# Patient Record
Sex: MALE | Race: WHITE | NOT HISPANIC OR LATINO
[De-identification: names, ages, dates, MRNs, and addresses within clinical notes are randomized per-mention and may not be internally consistent; named-entity substitution may affect disease eponyms.]

---

## 2017-12-01 PROBLEM — Z00.00 ENCOUNTER FOR PREVENTIVE HEALTH EXAMINATION: Status: ACTIVE | Noted: 2017-12-01

## 2017-12-07 ENCOUNTER — APPOINTMENT (OUTPATIENT)
Dept: VASCULAR SURGERY | Facility: CLINIC | Age: 57
End: 2017-12-07
Payer: MEDICARE

## 2017-12-07 VITALS — BODY MASS INDEX: 33.88 KG/M2 | WEIGHT: 242 LBS | HEIGHT: 71 IN

## 2017-12-07 DIAGNOSIS — Z89.512 ACQUIRED ABSENCE OF LEFT LEG BELOW KNEE: ICD-10-CM

## 2017-12-07 PROCEDURE — 99024 POSTOP FOLLOW-UP VISIT: CPT

## 2017-12-08 PROBLEM — Z89.512 S/P BKA (BELOW KNEE AMPUTATION), LEFT: Status: ACTIVE | Noted: 2017-12-08

## 2017-12-21 ENCOUNTER — APPOINTMENT (OUTPATIENT)
Dept: VASCULAR SURGERY | Facility: CLINIC | Age: 57
End: 2017-12-21
Payer: MEDICARE

## 2017-12-21 PROCEDURE — 99024 POSTOP FOLLOW-UP VISIT: CPT

## 2021-03-22 ENCOUNTER — OUTSIDE FACILITY SERVICE (OUTPATIENT)
Dept: CARDIOLOGY | Facility: CLINIC | Age: 61
End: 2021-03-22

## 2021-03-22 ENCOUNTER — HOSPITAL ENCOUNTER (OUTPATIENT)
Facility: HOSPITAL | Age: 61
Setting detail: SURGERY ADMIT
End: 2021-03-22
Attending: THORACIC SURGERY (CARDIOTHORACIC VASCULAR SURGERY) | Admitting: THORACIC SURGERY (CARDIOTHORACIC VASCULAR SURGERY)

## 2021-03-22 ENCOUNTER — APPOINTMENT (OUTPATIENT)
Dept: GENERAL RADIOLOGY | Facility: HOSPITAL | Age: 61
End: 2021-03-22

## 2021-03-22 ENCOUNTER — PREP FOR SURGERY (OUTPATIENT)
Dept: OTHER | Facility: HOSPITAL | Age: 61
End: 2021-03-22

## 2021-03-22 ENCOUNTER — HOSPITAL ENCOUNTER (INPATIENT)
Facility: HOSPITAL | Age: 61
LOS: 10 days | Discharge: HOME | End: 2021-04-01
Attending: THORACIC SURGERY (CARDIOTHORACIC VASCULAR SURGERY) | Admitting: INTERNAL MEDICINE

## 2021-03-22 DIAGNOSIS — I50.9 CONGESTIVE HEART FAILURE, UNSPECIFIED HF CHRONICITY, UNSPECIFIED HEART FAILURE TYPE (HCC): ICD-10-CM

## 2021-03-22 DIAGNOSIS — Z74.09 IMPAIRED MOBILITY AND ADLS: ICD-10-CM

## 2021-03-22 DIAGNOSIS — Z78.9 IMPAIRED MOBILITY AND ADLS: ICD-10-CM

## 2021-03-22 DIAGNOSIS — G47.00 INSOMNIA, UNSPECIFIED TYPE: ICD-10-CM

## 2021-03-22 DIAGNOSIS — Z74.09 IMPAIRED FUNCTIONAL MOBILITY, BALANCE, GAIT, AND ENDURANCE: ICD-10-CM

## 2021-03-22 DIAGNOSIS — Z74.09 IMPAIRED MOBILITY: Primary | ICD-10-CM

## 2021-03-22 DIAGNOSIS — I25.110 CORONARY ARTERY DISEASE INVOLVING NATIVE CORONARY ARTERY OF NATIVE HEART WITH UNSTABLE ANGINA PECTORIS (HCC): ICD-10-CM

## 2021-03-22 DIAGNOSIS — I25.10 MULTIPLE VESSEL CORONARY ARTERY DISEASE: Primary | ICD-10-CM

## 2021-03-22 LAB
ABO GROUP BLD: NORMAL
ALBUMIN SERPL-MCNC: 2.5 G/DL (ref 3.5–5.2)
ALBUMIN/GLOB SERPL: 0.7 G/DL
ALP SERPL-CCNC: 97 U/L (ref 39–117)
ALT SERPL W P-5'-P-CCNC: 5 U/L (ref 1–41)
ANION GAP SERPL CALCULATED.3IONS-SCNC: 11 MMOL/L (ref 5–15)
AST SERPL-CCNC: 12 U/L (ref 1–40)
BASOPHILS # BLD AUTO: 0.06 10*3/MM3 (ref 0–0.2)
BASOPHILS NFR BLD AUTO: 0.5 % (ref 0–1.5)
BILIRUB SERPL-MCNC: 1 MG/DL (ref 0–1.2)
BLD GP AB SCN SERPL QL: NEGATIVE
BUN SERPL-MCNC: 33 MG/DL (ref 8–23)
BUN/CREAT SERPL: 24.4 (ref 7–25)
CA-I SERPL ISE-MCNC: 1.28 MMOL/L (ref 1.12–1.32)
CALCIUM SPEC-SCNC: 8.1 MG/DL (ref 8.6–10.5)
CHLORIDE SERPL-SCNC: 102 MMOL/L (ref 98–107)
CK SERPL-CCNC: 137 U/L (ref 20–200)
CO2 SERPL-SCNC: 25 MMOL/L (ref 22–29)
CREAT SERPL-MCNC: 1.35 MG/DL (ref 0.76–1.27)
D-LACTATE SERPL-SCNC: 0.9 MMOL/L (ref 0.5–2)
DEPRECATED RDW RBC AUTO: 46 FL (ref 37–54)
EOSINOPHIL # BLD AUTO: 0.33 10*3/MM3 (ref 0–0.4)
EOSINOPHIL NFR BLD AUTO: 2.7 % (ref 0.3–6.2)
ERYTHROCYTE [DISTWIDTH] IN BLOOD BY AUTOMATED COUNT: 14 % (ref 12.3–15.4)
GFR SERPL CREATININE-BSD FRML MDRD: 53 ML/MIN/1.73
GLOBULIN UR ELPH-MCNC: 3.7 GM/DL
GLUCOSE BLDC GLUCOMTR-MCNC: 117 MG/DL (ref 70–130)
GLUCOSE SERPL-MCNC: 99 MG/DL (ref 65–99)
HBA1C MFR BLD: 11 % (ref 4.8–5.6)
HCT VFR BLD AUTO: 34.1 % (ref 37.5–51)
HGB BLD-MCNC: 11.1 G/DL (ref 13–17.7)
IMM GRANULOCYTES # BLD AUTO: 0.05 10*3/MM3 (ref 0–0.05)
IMM GRANULOCYTES NFR BLD AUTO: 0.4 % (ref 0–0.5)
LYMPHOCYTES # BLD AUTO: 1.75 10*3/MM3 (ref 0.7–3.1)
LYMPHOCYTES NFR BLD AUTO: 14.1 % (ref 19.6–45.3)
MAGNESIUM SERPL-MCNC: 1.6 MG/DL (ref 1.6–2.4)
MCH RBC QN AUTO: 29.7 PG (ref 26.6–33)
MCHC RBC AUTO-ENTMCNC: 32.6 G/DL (ref 31.5–35.7)
MCV RBC AUTO: 91.2 FL (ref 79–97)
MONOCYTES # BLD AUTO: 1.09 10*3/MM3 (ref 0.1–0.9)
MONOCYTES NFR BLD AUTO: 8.8 % (ref 5–12)
NEUTROPHILS NFR BLD AUTO: 73.5 % (ref 42.7–76)
NEUTROPHILS NFR BLD AUTO: 9.11 10*3/MM3 (ref 1.7–7)
NRBC BLD AUTO-RTO: 0 /100 WBC (ref 0–0.2)
NT-PROBNP SERPL-MCNC: ABNORMAL PG/ML (ref 0–900)
PHOSPHATE SERPL-MCNC: 3.8 MG/DL (ref 2.5–4.5)
PLATELET # BLD AUTO: 222 10*3/MM3 (ref 140–450)
PMV BLD AUTO: 11.8 FL (ref 6–12)
POTASSIUM SERPL-SCNC: 3.9 MMOL/L (ref 3.5–5.2)
PROCALCITONIN SERPL-MCNC: 0.3 NG/ML (ref 0–0.25)
PROT SERPL-MCNC: 6.2 G/DL (ref 6–8.5)
RBC # BLD AUTO: 3.74 10*6/MM3 (ref 4.14–5.8)
RH BLD: POSITIVE
SODIUM SERPL-SCNC: 138 MMOL/L (ref 136–145)
T&S EXPIRATION DATE: NORMAL
T4 FREE SERPL-MCNC: 1.51 NG/DL (ref 0.93–1.7)
TSH SERPL DL<=0.05 MIU/L-ACNC: 1.35 UIU/ML (ref 0.27–4.2)
UFH PPP CHRO-ACNC: 0.1 IU/ML (ref 0.3–0.7)
WBC # BLD AUTO: 12.39 10*3/MM3 (ref 3.4–10.8)

## 2021-03-22 PROCEDURE — 93325 DOPPLER ECHO COLOR FLOW MAPG: CPT | Performed by: INTERNAL MEDICINE

## 2021-03-22 PROCEDURE — 83735 ASSAY OF MAGNESIUM: CPT | Performed by: NURSE PRACTITIONER

## 2021-03-22 PROCEDURE — 83880 ASSAY OF NATRIURETIC PEPTIDE: CPT | Performed by: NURSE PRACTITIONER

## 2021-03-22 PROCEDURE — 83036 HEMOGLOBIN GLYCOSYLATED A1C: CPT | Performed by: NURSE PRACTITIONER

## 2021-03-22 PROCEDURE — 82550 ASSAY OF CK (CPK): CPT | Performed by: NURSE PRACTITIONER

## 2021-03-22 PROCEDURE — 85576 BLOOD PLATELET AGGREGATION: CPT | Performed by: THORACIC SURGERY (CARDIOTHORACIC VASCULAR SURGERY)

## 2021-03-22 PROCEDURE — 86900 BLOOD TYPING SEROLOGIC ABO: CPT | Performed by: THORACIC SURGERY (CARDIOTHORACIC VASCULAR SURGERY)

## 2021-03-22 PROCEDURE — 86923 COMPATIBILITY TEST ELECTRIC: CPT

## 2021-03-22 PROCEDURE — 85610 PROTHROMBIN TIME: CPT | Performed by: NURSE PRACTITIONER

## 2021-03-22 PROCEDURE — 84439 ASSAY OF FREE THYROXINE: CPT | Performed by: NURSE PRACTITIONER

## 2021-03-22 PROCEDURE — 93005 ELECTROCARDIOGRAM TRACING: CPT | Performed by: NURSE PRACTITIONER

## 2021-03-22 PROCEDURE — 82962 GLUCOSE BLOOD TEST: CPT

## 2021-03-22 PROCEDURE — 83605 ASSAY OF LACTIC ACID: CPT | Performed by: NURSE PRACTITIONER

## 2021-03-22 PROCEDURE — 84145 PROCALCITONIN (PCT): CPT | Performed by: NURSE PRACTITIONER

## 2021-03-22 PROCEDURE — 80053 COMPREHEN METABOLIC PANEL: CPT | Performed by: NURSE PRACTITIONER

## 2021-03-22 PROCEDURE — 86850 RBC ANTIBODY SCREEN: CPT | Performed by: THORACIC SURGERY (CARDIOTHORACIC VASCULAR SURGERY)

## 2021-03-22 PROCEDURE — 84443 ASSAY THYROID STIM HORMONE: CPT | Performed by: NURSE PRACTITIONER

## 2021-03-22 PROCEDURE — 85730 THROMBOPLASTIN TIME PARTIAL: CPT | Performed by: THORACIC SURGERY (CARDIOTHORACIC VASCULAR SURGERY)

## 2021-03-22 PROCEDURE — 93308 TTE F-UP OR LMTD: CPT | Performed by: INTERNAL MEDICINE

## 2021-03-22 PROCEDURE — 93458 L HRT ARTERY/VENTRICLE ANGIO: CPT | Performed by: INTERNAL MEDICINE

## 2021-03-22 PROCEDURE — 71045 X-RAY EXAM CHEST 1 VIEW: CPT

## 2021-03-22 PROCEDURE — 84484 ASSAY OF TROPONIN QUANT: CPT | Performed by: NURSE PRACTITIONER

## 2021-03-22 PROCEDURE — 99223 1ST HOSP IP/OBS HIGH 75: CPT | Performed by: INTERNAL MEDICINE

## 2021-03-22 PROCEDURE — 84100 ASSAY OF PHOSPHORUS: CPT | Performed by: NURSE PRACTITIONER

## 2021-03-22 PROCEDURE — 85025 COMPLETE CBC W/AUTO DIFF WBC: CPT | Performed by: NURSE PRACTITIONER

## 2021-03-22 PROCEDURE — 93321 DOPPLER ECHO F-UP/LMTD STD: CPT | Performed by: INTERNAL MEDICINE

## 2021-03-22 PROCEDURE — 82330 ASSAY OF CALCIUM: CPT | Performed by: NURSE PRACTITIONER

## 2021-03-22 PROCEDURE — 85520 HEPARIN ASSAY: CPT | Performed by: THORACIC SURGERY (CARDIOTHORACIC VASCULAR SURGERY)

## 2021-03-22 PROCEDURE — 86901 BLOOD TYPING SEROLOGIC RH(D): CPT | Performed by: THORACIC SURGERY (CARDIOTHORACIC VASCULAR SURGERY)

## 2021-03-22 RX ORDER — HEPARIN SODIUM 10000 [USP'U]/100ML
17 INJECTION, SOLUTION INTRAVENOUS
Status: DISCONTINUED | OUTPATIENT
Start: 2021-03-22 | End: 2021-03-24

## 2021-03-22 RX ORDER — MAGNESIUM SULFATE HEPTAHYDRATE 40 MG/ML
2 INJECTION, SOLUTION INTRAVENOUS AS NEEDED
Status: DISCONTINUED | OUTPATIENT
Start: 2021-03-22 | End: 2021-03-24

## 2021-03-22 RX ORDER — ATORVASTATIN CALCIUM 40 MG/1
80 TABLET, FILM COATED ORAL NIGHTLY
Status: DISCONTINUED | OUTPATIENT
Start: 2021-03-23 | End: 2021-03-24

## 2021-03-22 RX ORDER — NICOTINE POLACRILEX 4 MG
15 LOZENGE BUCCAL
Status: DISCONTINUED | OUTPATIENT
Start: 2021-03-22 | End: 2021-03-22

## 2021-03-22 RX ORDER — MAGNESIUM SULFATE HEPTAHYDRATE 40 MG/ML
4 INJECTION, SOLUTION INTRAVENOUS AS NEEDED
Status: DISCONTINUED | OUTPATIENT
Start: 2021-03-22 | End: 2021-03-24

## 2021-03-22 RX ORDER — AMOXICILLIN AND CLAVULANATE POTASSIUM 875; 125 MG/1; MG/1
1 TABLET, FILM COATED ORAL EVERY 12 HOURS SCHEDULED
Status: DISCONTINUED | OUTPATIENT
Start: 2021-03-22 | End: 2021-03-23

## 2021-03-22 RX ORDER — PANTOPRAZOLE SODIUM 40 MG/10ML
40 INJECTION, POWDER, LYOPHILIZED, FOR SOLUTION INTRAVENOUS
Status: DISCONTINUED | OUTPATIENT
Start: 2021-03-23 | End: 2021-04-01 | Stop reason: ALTCHOICE

## 2021-03-22 RX ORDER — SODIUM CHLORIDE 0.9 % (FLUSH) 0.9 %
10 SYRINGE (ML) INJECTION EVERY 12 HOURS SCHEDULED
Status: DISCONTINUED | OUTPATIENT
Start: 2021-03-22 | End: 2021-04-01 | Stop reason: HOSPADM

## 2021-03-22 RX ORDER — DEXTROSE MONOHYDRATE 25 G/50ML
25 INJECTION, SOLUTION INTRAVENOUS
Status: DISCONTINUED | OUTPATIENT
Start: 2021-03-22 | End: 2021-03-22

## 2021-03-22 RX ORDER — POTASSIUM CHLORIDE 7.45 MG/ML
10 INJECTION INTRAVENOUS
Status: DISCONTINUED | OUTPATIENT
Start: 2021-03-22 | End: 2021-03-24

## 2021-03-22 RX ORDER — DEXTROSE MONOHYDRATE 25 G/50ML
25 INJECTION, SOLUTION INTRAVENOUS
Status: DISCONTINUED | OUTPATIENT
Start: 2021-03-22 | End: 2021-03-24

## 2021-03-22 RX ORDER — DEXMEDETOMIDINE HYDROCHLORIDE 4 UG/ML
INJECTION, SOLUTION INTRAVENOUS
Status: DISPENSED
Start: 2021-03-22 | End: 2021-03-23

## 2021-03-22 RX ORDER — ASPIRIN 325 MG
325 TABLET, DELAYED RELEASE (ENTERIC COATED) ORAL DAILY
Status: DISCONTINUED | OUTPATIENT
Start: 2021-03-23 | End: 2021-03-24

## 2021-03-22 RX ORDER — SODIUM CHLORIDE 0.9 % (FLUSH) 0.9 %
10 SYRINGE (ML) INJECTION AS NEEDED
Status: DISCONTINUED | OUTPATIENT
Start: 2021-03-22 | End: 2021-03-24

## 2021-03-22 RX ORDER — NICOTINE POLACRILEX 4 MG
15 LOZENGE BUCCAL
Status: DISCONTINUED | OUTPATIENT
Start: 2021-03-22 | End: 2021-03-24

## 2021-03-23 ENCOUNTER — APPOINTMENT (OUTPATIENT)
Dept: CARDIOLOGY | Facility: HOSPITAL | Age: 61
End: 2021-03-23

## 2021-03-23 ENCOUNTER — APPOINTMENT (OUTPATIENT)
Dept: PULMONOLOGY | Facility: HOSPITAL | Age: 61
End: 2021-03-23

## 2021-03-23 ENCOUNTER — ANESTHESIA EVENT (OUTPATIENT)
Dept: PERIOP | Facility: HOSPITAL | Age: 61
End: 2021-03-23

## 2021-03-23 ENCOUNTER — APPOINTMENT (OUTPATIENT)
Dept: GENERAL RADIOLOGY | Facility: HOSPITAL | Age: 61
End: 2021-03-23

## 2021-03-23 LAB
ABO GROUP BLD: NORMAL
ALBUMIN SERPL-MCNC: 2.4 G/DL (ref 3.5–5.2)
ALBUMIN/GLOB SERPL: 0.6 G/DL
ALP SERPL-CCNC: 94 U/L (ref 39–117)
ALT SERPL W P-5'-P-CCNC: 5 U/L (ref 1–41)
AMPHET+METHAMPHET UR QL: NEGATIVE
AMPHETAMINES UR QL: NEGATIVE
ANION GAP SERPL CALCULATED.3IONS-SCNC: 10 MMOL/L (ref 5–15)
APTT PPP: 27.6 SECONDS (ref 50–95)
ARTERIAL PATENCY WRIST A: ABNORMAL
AST SERPL-CCNC: 12 U/L (ref 1–40)
ATMOSPHERIC PRESS: ABNORMAL MM[HG]
BACTERIA UR QL AUTO: ABNORMAL /HPF
BARBITURATES UR QL SCN: NEGATIVE
BASE EXCESS BLDA CALC-SCNC: 0.9 MMOL/L (ref 0–2)
BDY SITE: ABNORMAL
BENZODIAZ UR QL SCN: POSITIVE
BH CV ECHO MEAS - AO MAX PG (FULL): 3.3 MMHG
BH CV ECHO MEAS - AO MAX PG: 6.3 MMHG
BH CV ECHO MEAS - AO MEAN PG (FULL): 2.5 MMHG
BH CV ECHO MEAS - AO MEAN PG: 4.1 MMHG
BH CV ECHO MEAS - AO ROOT AREA (BSA CORRECTED): 1.3
BH CV ECHO MEAS - AO ROOT AREA: 7.6 CM^2
BH CV ECHO MEAS - AO ROOT DIAM: 3.1 CM
BH CV ECHO MEAS - AO V2 MAX: 125.5 CM/SEC
BH CV ECHO MEAS - AO V2 MEAN: 95.5 CM/SEC
BH CV ECHO MEAS - AO V2 VTI: 24.5 CM
BH CV ECHO MEAS - ASC AORTA: 3.1 CM
BH CV ECHO MEAS - AVA(I,A): 2.6 CM^2
BH CV ECHO MEAS - AVA(I,D): 2.6 CM^2
BH CV ECHO MEAS - AVA(V,A): 2.7 CM^2
BH CV ECHO MEAS - AVA(V,D): 2.7 CM^2
BH CV ECHO MEAS - BSA(HAYCOCK): 2.4 M^2
BH CV ECHO MEAS - BSA(HAYCOCK): 2.6 M^2
BH CV ECHO MEAS - BSA(HAYCOCK): 2.6 M^2
BH CV ECHO MEAS - BSA: 2.3 M^2
BH CV ECHO MEAS - BSA: 2.4 M^2
BH CV ECHO MEAS - BSA: 2.4 M^2
BH CV ECHO MEAS - BZI_BMI: 34.6 KILOGRAMS/M^2
BH CV ECHO MEAS - BZI_BMI: 40.8 KILOGRAMS/M^2
BH CV ECHO MEAS - BZI_BMI: 40.8 KILOGRAMS/M^2
BH CV ECHO MEAS - BZI_METRIC_HEIGHT: 177.8 CM
BH CV ECHO MEAS - BZI_METRIC_HEIGHT: 177.8 CM
BH CV ECHO MEAS - BZI_METRIC_HEIGHT: 180.3 CM
BH CV ECHO MEAS - BZI_METRIC_WEIGHT: 112.5 KG
BH CV ECHO MEAS - BZI_METRIC_WEIGHT: 128.8 KG
BH CV ECHO MEAS - BZI_METRIC_WEIGHT: 128.8 KG
BH CV ECHO MEAS - EDV(CUBED): 133.2 ML
BH CV ECHO MEAS - EDV(MOD-SP4): 156 ML
BH CV ECHO MEAS - EDV(TEICH): 124.2 ML
BH CV ECHO MEAS - EF(CUBED): 58.6 %
BH CV ECHO MEAS - EF(TEICH): 49.9 %
BH CV ECHO MEAS - ESV(CUBED): 55.2 ML
BH CV ECHO MEAS - ESV(TEICH): 62.3 ML
BH CV ECHO MEAS - FS: 25.4 %
BH CV ECHO MEAS - IVS/LVPW: 1
BH CV ECHO MEAS - IVSD: 1.2 CM
BH CV ECHO MEAS - LA DIMENSION: 4.3 CM
BH CV ECHO MEAS - LA/AO: 1.4
BH CV ECHO MEAS - LAD MAJOR: 6 CM
BH CV ECHO MEAS - LAT PEAK E' VEL: 6.2 CM/SEC
BH CV ECHO MEAS - LATERAL E/E' RATIO: 17.2
BH CV ECHO MEAS - LV DIASTOLIC VOL/BSA (35-75): 64.4 ML/M^2
BH CV ECHO MEAS - LV IVRT: 0.05 SEC
BH CV ECHO MEAS - LV MASS(C)D: 241.7 GRAMS
BH CV ECHO MEAS - LV MASS(C)DI: 99.7 GRAMS/M^2
BH CV ECHO MEAS - LV MAX PG: 3 MMHG
BH CV ECHO MEAS - LV MEAN PG: 1.6 MMHG
BH CV ECHO MEAS - LV V1 MAX: 87.2 CM/SEC
BH CV ECHO MEAS - LV V1 MEAN: 57.1 CM/SEC
BH CV ECHO MEAS - LV V1 VTI: 16.4 CM
BH CV ECHO MEAS - LVIDD: 5.1 CM
BH CV ECHO MEAS - LVIDS: 3.8 CM
BH CV ECHO MEAS - LVLD AP4: 8.5 CM
BH CV ECHO MEAS - LVOT AREA (M): 3.8 CM^2
BH CV ECHO MEAS - LVOT AREA: 3.8 CM^2
BH CV ECHO MEAS - LVOT DIAM: 2.2 CM
BH CV ECHO MEAS - LVPWD: 1.2 CM
BH CV ECHO MEAS - MED PEAK E' VEL: 4.1 CM/SEC
BH CV ECHO MEAS - MEDIAL E/E' RATIO: 26.1
BH CV ECHO MEAS - MR MAX PG: 43 MMHG
BH CV ECHO MEAS - MR MAX VEL: 327.7 CM/SEC
BH CV ECHO MEAS - MR MEAN PG: 28.7 MMHG
BH CV ECHO MEAS - MR MEAN VEL: 251.4 CM/SEC
BH CV ECHO MEAS - MR VTI: 109.8 CM
BH CV ECHO MEAS - MV A MAX VEL: 61.7 CM/SEC
BH CV ECHO MEAS - MV DEC SLOPE: 427.8 CM/SEC^2
BH CV ECHO MEAS - MV DEC TIME: 0.13 SEC
BH CV ECHO MEAS - MV E MAX VEL: 108.6 CM/SEC
BH CV ECHO MEAS - MV E/A: 1.8
BH CV ECHO MEAS - MV P1/2T MAX VEL: 139.1 CM/SEC
BH CV ECHO MEAS - MV P1/2T: 95.2 MSEC
BH CV ECHO MEAS - MVA P1/2T LCG: 1.6 CM^2
BH CV ECHO MEAS - MVA(P1/2T): 2.3 CM^2
BH CV ECHO MEAS - PA ACC SLOPE: 1593 CM/SEC^2
BH CV ECHO MEAS - PA ACC TIME: 0.04 SEC
BH CV ECHO MEAS - PA PR(ACCEL): 61.6 MMHG
BH CV ECHO MEAS - SI(AO): 76.7 ML/M^2
BH CV ECHO MEAS - SI(CUBED): 32.2 ML/M^2
BH CV ECHO MEAS - SI(LVOT): 26 ML/M^2
BH CV ECHO MEAS - SI(TEICH): 25.6 ML/M^2
BH CV ECHO MEAS - SV(AO): 185.9 ML
BH CV ECHO MEAS - SV(CUBED): 78 ML
BH CV ECHO MEAS - SV(LVOT): 63 ML
BH CV ECHO MEAS - SV(TEICH): 62 ML
BH CV ECHO MEAS - TAPSE (>1.6): 1.6 CM
BH CV ECHO MEASUREMENTS AVERAGE E/E' RATIO: 21.09
BH CV VAS BP RIGHT ARM: NORMAL MMHG
BH CV XLRA - RV BASE: 4 CM
BH CV XLRA - RV MID: 0.1 CM
BH CV XLRA MEAS - DIST GSV THIGH DIST LEFT: 0.38 CM
BH CV XLRA MEAS - DIST GSV THIGH DIST RIGHT: 0.44 CM
BH CV XLRA MEAS - GSV ORIGIN DIST LEFT: 0.77 CM
BH CV XLRA MEAS - GSV ORIGIN DIST RIGHT: 0.79 CM
BH CV XLRA MEAS - MID GSV THIGH  LEFT: 0.3 CM
BH CV XLRA MEAS - MID GSV THIGH  RIGHT: 0.35 CM
BH CV XLRA MEAS - PROX GSV THIGH  LEFT: 0.45 CM
BH CV XLRA MEAS - PROX GSV THIGH  RIGHT: 0.73 CM
BH CV XLRA MEAS LEFT CCA RATIO VEL: 74.2 CM/SEC
BH CV XLRA MEAS LEFT DIST CCA EDV: 13.8 CM/SEC
BH CV XLRA MEAS LEFT DIST CCA PSV: 78.6 CM/SEC
BH CV XLRA MEAS LEFT DIST ICA EDV: 25.9 CM/SEC
BH CV XLRA MEAS LEFT DIST ICA PSV: 65.6 CM/SEC
BH CV XLRA MEAS LEFT ICA RATIO VEL: 54.6 CM/SEC
BH CV XLRA MEAS LEFT ICA/CCA RATIO: 0.74
BH CV XLRA MEAS LEFT MID CCA EDV: 10.7 CM/SEC
BH CV XLRA MEAS LEFT MID CCA PSV: 74.8 CM/SEC
BH CV XLRA MEAS LEFT MID ICA EDV: 21.5 CM/SEC
BH CV XLRA MEAS LEFT MID ICA PSV: 55.1 CM/SEC
BH CV XLRA MEAS LEFT PROX CCA EDV: 12.6 CM/SEC
BH CV XLRA MEAS LEFT PROX CCA PSV: 90.5 CM/SEC
BH CV XLRA MEAS LEFT PROX ECA PSV: 107.5 CM/SEC
BH CV XLRA MEAS LEFT PROX ICA EDV: 17.6 CM/SEC
BH CV XLRA MEAS LEFT PROX ICA PSV: 46.5 CM/SEC
BH CV XLRA MEAS LEFT PROX SCLA PSV: 138.5 CM/SEC
BH CV XLRA MEAS LEFT VERTEBRAL A PSV: 42 CM/SEC
BH CV XLRA MEAS RIGHT CCA RATIO VEL: 67.3 CM/SEC
BH CV XLRA MEAS RIGHT DIST CCA EDV: 16.5 CM/SEC
BH CV XLRA MEAS RIGHT DIST CCA PSV: 73.9 CM/SEC
BH CV XLRA MEAS RIGHT ICA RATIO VEL: 70 CM/SEC
BH CV XLRA MEAS RIGHT ICA/CCA RATIO: 1
BH CV XLRA MEAS RIGHT MID CCA EDV: 13.2 CM/SEC
BH CV XLRA MEAS RIGHT MID CCA PSV: 67.8 CM/SEC
BH CV XLRA MEAS RIGHT PROX CCA EDV: 12.1 CM/SEC
BH CV XLRA MEAS RIGHT PROX CCA PSV: 64 CM/SEC
BH CV XLRA MEAS RIGHT PROX ECA PSV: 95.4 CM/SEC
BH CV XLRA MEAS RIGHT PROX ICA EDV: 24.3 CM/SEC
BH CV XLRA MEAS RIGHT PROX ICA PSV: 70.6 CM/SEC
BH CV XLRA MEAS RIGHT PROX SCLA PSV: 62.9 CM/SEC
BILIRUB SERPL-MCNC: 0.9 MG/DL (ref 0–1.2)
BILIRUB UR QL STRIP: NEGATIVE
BODY TEMPERATURE: 37 C
BUN SERPL-MCNC: 36 MG/DL (ref 8–23)
BUN/CREAT SERPL: 25 (ref 7–25)
BUPRENORPHINE SERPL-MCNC: NEGATIVE NG/ML
CALCIUM SPEC-SCNC: 8.1 MG/DL (ref 8.6–10.5)
CANNABINOIDS SERPL QL: NEGATIVE
CHLORIDE SERPL-SCNC: 103 MMOL/L (ref 98–107)
CHOLEST SERPL-MCNC: 154 MG/DL (ref 0–200)
CLARITY UR: CLEAR
CO2 BLDA-SCNC: 25.9 MMOL/L (ref 22–33)
CO2 SERPL-SCNC: 25 MMOL/L (ref 22–29)
COCAINE UR QL: NEGATIVE
COHGB MFR BLD: 1.2 % (ref 0–2)
COLOR UR: YELLOW
CREAT SERPL-MCNC: 1.44 MG/DL (ref 0.76–1.27)
DEPRECATED RDW RBC AUTO: 51.2 FL (ref 37–54)
ERYTHROCYTE [DISTWIDTH] IN BLOOD BY AUTOMATED COUNT: 14.2 % (ref 12.3–15.4)
FINE GRAN CASTS URNS QL MICRO: ABNORMAL /LPF
GFR SERPL CREATININE-BSD FRML MDRD: 50 ML/MIN/1.73
GLOBULIN UR ELPH-MCNC: 3.7 GM/DL
GLUCOSE BLDC GLUCOMTR-MCNC: 122 MG/DL (ref 70–130)
GLUCOSE BLDC GLUCOMTR-MCNC: 178 MG/DL (ref 70–130)
GLUCOSE BLDC GLUCOMTR-MCNC: 213 MG/DL (ref 70–130)
GLUCOSE BLDC GLUCOMTR-MCNC: 98 MG/DL (ref 70–130)
GLUCOSE SERPL-MCNC: 99 MG/DL (ref 65–99)
GLUCOSE UR STRIP-MCNC: ABNORMAL MG/DL
HCO3 BLDA-SCNC: 24.8 MMOL/L (ref 20–26)
HCT VFR BLD AUTO: 34.6 % (ref 37.5–51)
HCT VFR BLD CALC: 38 %
HDLC SERPL-MCNC: 30 MG/DL (ref 40–60)
HGB BLD-MCNC: 10.7 G/DL (ref 13–17.7)
HGB BLDA-MCNC: 12.4 G/DL (ref 13.5–17.5)
HGB UR QL STRIP.AUTO: ABNORMAL
HYALINE CASTS UR QL AUTO: ABNORMAL /LPF
INHALED O2 CONCENTRATION: 28 %
INR PPP: 1.16 (ref 0.85–1.16)
KETONES UR QL STRIP: NEGATIVE
LDLC SERPL CALC-MCNC: 100 MG/DL (ref 0–100)
LDLC/HDLC SERPL: 3.25 {RATIO}
LEUKOCYTE ESTERASE UR QL STRIP.AUTO: NEGATIVE
MCH RBC QN AUTO: 30.4 PG (ref 26.6–33)
MCHC RBC AUTO-ENTMCNC: 30.9 G/DL (ref 31.5–35.7)
MCV RBC AUTO: 98.3 FL (ref 79–97)
METHADONE UR QL SCN: NEGATIVE
METHGB BLD QL: 0.1 % (ref 0–1.5)
MODALITY: ABNORMAL
MUCOUS THREADS URNS QL MICRO: ABNORMAL /HPF
NITRITE UR QL STRIP: NEGATIVE
NOTE: ABNORMAL
OPIATES UR QL: NEGATIVE
OXYCODONE UR QL SCN: NEGATIVE
OXYHGB MFR BLDV: 91.5 % (ref 94–99)
PA ADP PRP-ACNC: 300 PRU
PCO2 BLDA: 36.3 MM HG (ref 35–45)
PCO2 TEMP ADJ BLD: 36.3 MM HG (ref 35–48)
PCP UR QL SCN: NEGATIVE
PH BLDA: 7.44 PH UNITS (ref 7.35–7.45)
PH UR STRIP.AUTO: <=5 [PH] (ref 5–8)
PH, TEMP CORRECTED: 7.44 PH UNITS
PLATELET # BLD AUTO: 218 10*3/MM3 (ref 140–450)
PMV BLD AUTO: 11.3 FL (ref 6–12)
PO2 BLDA: 59.8 MM HG (ref 83–108)
PO2 TEMP ADJ BLD: 59.8 MM HG (ref 83–108)
POTASSIUM SERPL-SCNC: 4 MMOL/L (ref 3.5–5.2)
PROPOXYPH UR QL: NEGATIVE
PROT SERPL-MCNC: 6.1 G/DL (ref 6–8.5)
PROT UR QL STRIP: ABNORMAL
PROTHROMBIN TIME: 14.5 SECONDS (ref 11.5–14)
QT INTERVAL: 382 MS
QTC INTERVAL: 462 MS
RBC # BLD AUTO: 3.52 10*6/MM3 (ref 4.14–5.8)
RBC # UR: ABNORMAL /HPF
REF LAB TEST METHOD: ABNORMAL
RH BLD: POSITIVE
RIGHT ARM BP: NORMAL MMHG
SARS-COV-2 RDRP RESP QL NAA+PROBE: NORMAL
SODIUM SERPL-SCNC: 138 MMOL/L (ref 136–145)
SP GR UR STRIP: 1.03 (ref 1–1.03)
SQUAMOUS #/AREA URNS HPF: ABNORMAL /HPF
TRICYCLICS UR QL SCN: NEGATIVE
TRIGL SERPL-MCNC: 132 MG/DL (ref 0–150)
TROPONIN T SERPL-MCNC: 0.3 NG/ML (ref 0–0.03)
UFH PPP CHRO-ACNC: 0.1 IU/ML (ref 0.3–0.7)
UFH PPP CHRO-ACNC: 0.23 IU/ML (ref 0.3–0.7)
UFH PPP CHRO-ACNC: 0.25 IU/ML (ref 0.3–0.7)
UROBILINOGEN UR QL STRIP: ABNORMAL
VENTILATOR MODE: ABNORMAL
VLDLC SERPL-MCNC: 24 MG/DL (ref 5–40)
WBC # BLD AUTO: 12.91 10*3/MM3 (ref 3.4–10.8)
WBC UR QL AUTO: ABNORMAL /HPF

## 2021-03-23 PROCEDURE — 93306 TTE W/DOPPLER COMPLETE: CPT

## 2021-03-23 PROCEDURE — 82375 ASSAY CARBOXYHB QUANT: CPT

## 2021-03-23 PROCEDURE — 25010000002 ONDANSETRON PER 1 MG: Performed by: NURSE PRACTITIONER

## 2021-03-23 PROCEDURE — 85520 HEPARIN ASSAY: CPT

## 2021-03-23 PROCEDURE — 25010000002 HEPARIN (PORCINE) 25000-0.45 UT/250ML-% SOLUTION

## 2021-03-23 PROCEDURE — 25010000002 DAPTOMYCIN PER 1 MG: Performed by: INTERNAL MEDICINE

## 2021-03-23 PROCEDURE — 82805 BLOOD GASES W/O2 SATURATION: CPT

## 2021-03-23 PROCEDURE — 82962 GLUCOSE BLOOD TEST: CPT

## 2021-03-23 PROCEDURE — 85027 COMPLETE CBC AUTOMATED: CPT | Performed by: PHYSICIAN ASSISTANT

## 2021-03-23 PROCEDURE — 81001 URINALYSIS AUTO W/SCOPE: CPT | Performed by: NURSE PRACTITIONER

## 2021-03-23 PROCEDURE — 87086 URINE CULTURE/COLONY COUNT: CPT | Performed by: NURSE PRACTITIONER

## 2021-03-23 PROCEDURE — 93880 EXTRACRANIAL BILAT STUDY: CPT | Performed by: INTERNAL MEDICINE

## 2021-03-23 PROCEDURE — 99232 SBSQ HOSP IP/OBS MODERATE 35: CPT | Performed by: INTERNAL MEDICINE

## 2021-03-23 PROCEDURE — 99223 1ST HOSP IP/OBS HIGH 75: CPT | Performed by: THORACIC SURGERY (CARDIOTHORACIC VASCULAR SURGERY)

## 2021-03-23 PROCEDURE — 93970 EXTREMITY STUDY: CPT

## 2021-03-23 PROCEDURE — 93970 EXTREMITY STUDY: CPT | Performed by: INTERNAL MEDICINE

## 2021-03-23 PROCEDURE — 80306 DRUG TEST PRSMV INSTRMNT: CPT | Performed by: NURSE PRACTITIONER

## 2021-03-23 PROCEDURE — 25010000003 MAGNESIUM SULFATE 4 GM/100ML SOLUTION: Performed by: INTERNAL MEDICINE

## 2021-03-23 PROCEDURE — 80061 LIPID PANEL: CPT | Performed by: INTERNAL MEDICINE

## 2021-03-23 PROCEDURE — 87635 SARS-COV-2 COVID-19 AMP PRB: CPT | Performed by: PHYSICIAN ASSISTANT

## 2021-03-23 PROCEDURE — 73130 X-RAY EXAM OF HAND: CPT

## 2021-03-23 PROCEDURE — 93880 EXTRACRANIAL BILAT STUDY: CPT

## 2021-03-23 PROCEDURE — 25010000002 HEPARIN (PORCINE) 25000-0.45 UT/250ML-% SOLUTION: Performed by: THORACIC SURGERY (CARDIOTHORACIC VASCULAR SURGERY)

## 2021-03-23 PROCEDURE — 83050 HGB METHEMOGLOBIN QUAN: CPT

## 2021-03-23 PROCEDURE — 86900 BLOOD TYPING SEROLOGIC ABO: CPT

## 2021-03-23 PROCEDURE — 93306 TTE W/DOPPLER COMPLETE: CPT | Performed by: INTERNAL MEDICINE

## 2021-03-23 PROCEDURE — 25010000003 AMPICILLIN-SULBACTAM PER 1.5 G: Performed by: INTERNAL MEDICINE

## 2021-03-23 PROCEDURE — 36600 WITHDRAWAL OF ARTERIAL BLOOD: CPT

## 2021-03-23 PROCEDURE — 86901 BLOOD TYPING SEROLOGIC RH(D): CPT

## 2021-03-23 PROCEDURE — 63710000001 INSULIN LISPRO (HUMAN) PER 5 UNITS: Performed by: INTERNAL MEDICINE

## 2021-03-23 PROCEDURE — 80053 COMPREHEN METABOLIC PANEL: CPT | Performed by: PHYSICIAN ASSISTANT

## 2021-03-23 PROCEDURE — 94010 BREATHING CAPACITY TEST: CPT

## 2021-03-23 PROCEDURE — 94010 BREATHING CAPACITY TEST: CPT | Performed by: INTERNAL MEDICINE

## 2021-03-23 RX ORDER — CHLORHEXIDINE GLUCONATE 0.12 MG/ML
15 RINSE ORAL EVERY 12 HOURS
Status: DISPENSED | OUTPATIENT
Start: 2021-03-23 | End: 2021-03-24

## 2021-03-23 RX ORDER — ASPIRIN 81 MG/1
81 TABLET ORAL ONCE
Status: COMPLETED | OUTPATIENT
Start: 2021-03-23 | End: 2021-03-23

## 2021-03-23 RX ORDER — CHLORHEXIDINE GLUCONATE 500 MG/1
1 CLOTH TOPICAL EVERY 12 HOURS PRN
Status: DISCONTINUED | OUTPATIENT
Start: 2021-03-23 | End: 2021-03-23

## 2021-03-23 RX ORDER — SODIUM CHLORIDE 0.9 % (FLUSH) 0.9 %
10 SYRINGE (ML) INJECTION AS NEEDED
Status: CANCELLED | OUTPATIENT
Start: 2021-03-23

## 2021-03-23 RX ORDER — SODIUM CHLORIDE, SODIUM LACTATE, POTASSIUM CHLORIDE, CALCIUM CHLORIDE 600; 310; 30; 20 MG/100ML; MG/100ML; MG/100ML; MG/100ML
9 INJECTION, SOLUTION INTRAVENOUS CONTINUOUS
Status: CANCELLED | OUTPATIENT
Start: 2021-03-23

## 2021-03-23 RX ORDER — ONDANSETRON 2 MG/ML
4 INJECTION INTRAMUSCULAR; INTRAVENOUS EVERY 6 HOURS PRN
Status: DISCONTINUED | OUTPATIENT
Start: 2021-03-23 | End: 2021-03-24

## 2021-03-23 RX ORDER — SODIUM CHLORIDE 0.9 % (FLUSH) 0.9 %
10 SYRINGE (ML) INJECTION AS NEEDED
Status: DISCONTINUED | OUTPATIENT
Start: 2021-03-23 | End: 2021-03-24

## 2021-03-23 RX ORDER — SODIUM CHLORIDE 0.9 % (FLUSH) 0.9 %
10 SYRINGE (ML) INJECTION EVERY 12 HOURS SCHEDULED
Status: CANCELLED | OUTPATIENT
Start: 2021-03-23

## 2021-03-23 RX ORDER — PAROXETINE HYDROCHLORIDE 20 MG/1
40 TABLET, FILM COATED ORAL DAILY
Status: DISCONTINUED | OUTPATIENT
Start: 2021-03-23 | End: 2021-03-29

## 2021-03-23 RX ORDER — AMPICILLIN AND SULBACTAM 2; 1 G/1; G/1
3 INJECTION, POWDER, FOR SOLUTION INTRAMUSCULAR; INTRAVENOUS EVERY 6 HOURS SCHEDULED
Status: DISCONTINUED | OUTPATIENT
Start: 2021-03-23 | End: 2021-03-23

## 2021-03-23 RX ORDER — IPRATROPIUM BROMIDE AND ALBUTEROL SULFATE 2.5; .5 MG/3ML; MG/3ML
3 SOLUTION RESPIRATORY (INHALATION) EVERY 4 HOURS PRN
Status: DISCONTINUED | OUTPATIENT
Start: 2021-03-23 | End: 2021-03-24

## 2021-03-23 RX ORDER — LIDOCAINE HYDROCHLORIDE 10 MG/ML
0.5 INJECTION, SOLUTION EPIDURAL; INFILTRATION; INTRACAUDAL; PERINEURAL ONCE AS NEEDED
Status: CANCELLED | OUTPATIENT
Start: 2021-03-23

## 2021-03-23 RX ORDER — SODIUM CHLORIDE 0.9 % (FLUSH) 0.9 %
10 SYRINGE (ML) INJECTION EVERY 12 HOURS SCHEDULED
Status: DISCONTINUED | OUTPATIENT
Start: 2021-03-23 | End: 2021-03-25

## 2021-03-23 RX ORDER — FAMOTIDINE 20 MG/1
20 TABLET, FILM COATED ORAL ONCE
Status: CANCELLED | OUTPATIENT
Start: 2021-03-23 | End: 2021-03-23

## 2021-03-23 RX ORDER — MIDAZOLAM HYDROCHLORIDE 1 MG/ML
1 INJECTION INTRAMUSCULAR; INTRAVENOUS
Status: CANCELLED | OUTPATIENT
Start: 2021-03-23

## 2021-03-23 RX ORDER — CASTOR OIL AND BALSAM, PERU 788; 87 MG/G; MG/G
OINTMENT TOPICAL EVERY 12 HOURS SCHEDULED
Status: DISCONTINUED | OUTPATIENT
Start: 2021-03-23 | End: 2021-04-01 | Stop reason: HOSPADM

## 2021-03-23 RX ORDER — ZOLPIDEM TARTRATE 5 MG/1
10 TABLET ORAL ONCE AS NEEDED
Status: COMPLETED | OUTPATIENT
Start: 2021-03-23 | End: 2021-03-23

## 2021-03-23 RX ORDER — MIDAZOLAM HYDROCHLORIDE 1 MG/ML
2 INJECTION INTRAMUSCULAR; INTRAVENOUS
Status: CANCELLED | OUTPATIENT
Start: 2021-03-23

## 2021-03-23 RX ORDER — CHLORHEXIDINE GLUCONATE 500 MG/1
1 CLOTH TOPICAL EVERY 12 HOURS PRN
Status: DISCONTINUED | OUTPATIENT
Start: 2021-03-23 | End: 2021-03-24

## 2021-03-23 RX ADMIN — AMOXICILLIN AND CLAVULANATE POTASSIUM 1 TABLET: 875; 125 TABLET, FILM COATED ORAL at 00:18

## 2021-03-23 RX ADMIN — AMOXICILLIN AND CLAVULANATE POTASSIUM 1 TABLET: 875; 125 TABLET, FILM COATED ORAL at 13:19

## 2021-03-23 RX ADMIN — PAROXETINE HYDROCHLORIDE 40 MG: 20 TABLET, FILM COATED ORAL at 17:42

## 2021-03-23 RX ADMIN — METOPROLOL TARTRATE 12.5 MG: 25 TABLET, FILM COATED ORAL at 22:48

## 2021-03-23 RX ADMIN — CASTOR OIL AND BALSAM, PERU: 788; 87 OINTMENT TOPICAL at 22:54

## 2021-03-23 RX ADMIN — PANTOPRAZOLE SODIUM 40 MG: 40 INJECTION, POWDER, FOR SOLUTION INTRAVENOUS at 07:48

## 2021-03-23 RX ADMIN — MAGNESIUM SULFATE HEPTAHYDRATE 4 G: 40 INJECTION, SOLUTION INTRAVENOUS at 06:30

## 2021-03-23 RX ADMIN — METOPROLOL TARTRATE 12.5 MG: 25 TABLET, FILM COATED ORAL at 00:07

## 2021-03-23 RX ADMIN — SODIUM CHLORIDE 3 G: 900 INJECTION INTRAVENOUS at 17:15

## 2021-03-23 RX ADMIN — HEPARIN SODIUM 15 UNITS/KG/HR: 10000 INJECTION, SOLUTION INTRAVENOUS at 16:11

## 2021-03-23 RX ADMIN — ASPIRIN 81 MG: 81 TABLET, COATED ORAL at 22:49

## 2021-03-23 RX ADMIN — HEPARIN SODIUM 9 UNITS/KG/HR: 10000 INJECTION, SOLUTION INTRAVENOUS at 00:19

## 2021-03-23 RX ADMIN — INSULIN LISPRO 4 UNITS: 100 INJECTION, SOLUTION INTRAVENOUS; SUBCUTANEOUS at 17:44

## 2021-03-23 RX ADMIN — DAPTOMYCIN 550 MG: 500 INJECTION, POWDER, LYOPHILIZED, FOR SOLUTION INTRAVENOUS at 17:14

## 2021-03-23 RX ADMIN — ATORVASTATIN CALCIUM 80 MG: 40 TABLET, FILM COATED ORAL at 00:06

## 2021-03-23 RX ADMIN — DOXYCYCLINE 100 MG: 100 INJECTION, POWDER, LYOPHILIZED, FOR SOLUTION INTRAVENOUS at 12:17

## 2021-03-23 RX ADMIN — ONDANSETRON 4 MG: 2 INJECTION INTRAMUSCULAR; INTRAVENOUS at 03:40

## 2021-03-23 RX ADMIN — ZOLPIDEM TARTRATE 10 MG: 5 TABLET ORAL at 22:48

## 2021-03-23 RX ADMIN — MUPIROCIN 1 APPLICATION: 20 OINTMENT TOPICAL at 17:42

## 2021-03-23 RX ADMIN — ASPIRIN 325 MG: 325 TABLET, COATED ORAL at 00:06

## 2021-03-23 RX ADMIN — ATORVASTATIN CALCIUM 80 MG: 40 TABLET, FILM COATED ORAL at 22:53

## 2021-03-23 RX ADMIN — CASTOR OIL AND BALSAM, PERU: 788; 87 OINTMENT TOPICAL at 13:19

## 2021-03-23 RX ADMIN — METOPROLOL TARTRATE 12.5 MG: 25 TABLET, FILM COATED ORAL at 12:16

## 2021-03-23 RX ADMIN — CHLORHEXIDINE GLUCONATE 15 ML: 1.2 SOLUTION ORAL at 17:42

## 2021-03-23 RX ADMIN — DOXYCYCLINE 100 MG: 100 INJECTION, POWDER, LYOPHILIZED, FOR SOLUTION INTRAVENOUS at 00:18

## 2021-03-24 ENCOUNTER — ANCILLARY PROCEDURE (OUTPATIENT)
Dept: PERIOP | Facility: HOSPITAL | Age: 61
End: 2021-03-24

## 2021-03-24 ENCOUNTER — APPOINTMENT (OUTPATIENT)
Dept: GENERAL RADIOLOGY | Facility: HOSPITAL | Age: 61
End: 2021-03-24

## 2021-03-24 ENCOUNTER — ANESTHESIA (OUTPATIENT)
Dept: PERIOP | Facility: HOSPITAL | Age: 61
End: 2021-03-24

## 2021-03-24 LAB
ACT BLD: 131 SECONDS (ref 82–152)
ACT BLD: 142 SECONDS (ref 82–152)
ACT BLD: 384 SECONDS (ref 82–152)
ACT BLD: 384 SECONDS (ref 82–152)
ACT BLD: 439 SECONDS (ref 82–152)
ACT BLD: 450 SECONDS (ref 82–152)
ALBUMIN SERPL-MCNC: 2.6 G/DL (ref 3.5–5.2)
ALBUMIN SERPL-MCNC: 3.3 G/DL (ref 3.5–5.2)
ALBUMIN SERPL-MCNC: 3.4 G/DL (ref 3.5–5.2)
ANION GAP SERPL CALCULATED.3IONS-SCNC: 10 MMOL/L (ref 5–15)
ANION GAP SERPL CALCULATED.3IONS-SCNC: 15 MMOL/L (ref 5–15)
ANION GAP SERPL CALCULATED.3IONS-SCNC: 17 MMOL/L (ref 5–15)
APTT PPP: 32.3 SECONDS (ref 24–37)
ARTERIAL PATENCY WRIST A: ABNORMAL
ARTERIAL PATENCY WRIST A: ABNORMAL
ATMOSPHERIC PRESS: ABNORMAL MM[HG]
ATMOSPHERIC PRESS: ABNORMAL MM[HG]
BACTERIA SPEC AEROBE CULT: NO GROWTH
BASE EXCESS BLDA CALC-SCNC: -1 MMOL/L (ref -5–5)
BASE EXCESS BLDA CALC-SCNC: -1 MMOL/L (ref -5–5)
BASE EXCESS BLDA CALC-SCNC: -1.4 MMOL/L (ref 0–2)
BASE EXCESS BLDA CALC-SCNC: -1.6 MMOL/L (ref 0–2)
BASE EXCESS BLDA CALC-SCNC: -2 MMOL/L (ref -5–5)
BASE EXCESS BLDA CALC-SCNC: -4 MMOL/L (ref -5–5)
BASE EXCESS BLDA CALC-SCNC: 0 MMOL/L (ref -5–5)
BASE EXCESS BLDA CALC-SCNC: 3 MMOL/L (ref -5–5)
BASE EXCESS BLDA CALC-SCNC: 3 MMOL/L (ref -5–5)
BASOPHILS # BLD AUTO: 0.07 10*3/MM3 (ref 0–0.2)
BASOPHILS NFR BLD AUTO: 0.5 % (ref 0–1.5)
BDY SITE: ABNORMAL
BDY SITE: ABNORMAL
BODY TEMPERATURE: 37 C
BODY TEMPERATURE: 37 C
BUN SERPL-MCNC: 35 MG/DL (ref 8–23)
BUN SERPL-MCNC: 36 MG/DL (ref 8–23)
BUN SERPL-MCNC: 39 MG/DL (ref 8–23)
BUN/CREAT SERPL: 24 (ref 7–25)
BUN/CREAT SERPL: 24.8 (ref 7–25)
BUN/CREAT SERPL: 25.8 (ref 7–25)
CA-I BLDA-SCNC: 1 MMOL/L (ref 1.2–1.32)
CA-I BLDA-SCNC: 1.02 MMOL/L (ref 1.2–1.32)
CA-I BLDA-SCNC: 1.05 MMOL/L (ref 1.2–1.32)
CA-I BLDA-SCNC: 1.07 MMOL/L (ref 1.2–1.32)
CA-I BLDA-SCNC: 1.1 MMOL/L (ref 1.2–1.32)
CA-I BLDA-SCNC: 1.17 MMOL/L (ref 1.2–1.32)
CA-I BLDA-SCNC: 1.36 MMOL/L (ref 1.2–1.32)
CA-I SERPL ISE-MCNC: 1.39 MMOL/L (ref 1.12–1.32)
CALCIUM SPEC-SCNC: 8.7 MG/DL (ref 8.6–10.5)
CALCIUM SPEC-SCNC: 8.7 MG/DL (ref 8.6–10.5)
CALCIUM SPEC-SCNC: 9.4 MG/DL (ref 8.6–10.5)
CHLORIDE SERPL-SCNC: 101 MMOL/L (ref 98–107)
CHLORIDE SERPL-SCNC: 106 MMOL/L (ref 98–107)
CHLORIDE SERPL-SCNC: 107 MMOL/L (ref 98–107)
CO2 BLDA-SCNC: 22 MMOL/L (ref 24–29)
CO2 BLDA-SCNC: 23.1 MMOL/L (ref 22–33)
CO2 BLDA-SCNC: 25 MMOL/L (ref 24–29)
CO2 BLDA-SCNC: 25 MMOL/L (ref 24–29)
CO2 BLDA-SCNC: 25.1 MMOL/L (ref 22–33)
CO2 BLDA-SCNC: 26 MMOL/L (ref 24–29)
CO2 BLDA-SCNC: 26 MMOL/L (ref 24–29)
CO2 BLDA-SCNC: 29 MMOL/L (ref 24–29)
CO2 BLDA-SCNC: 30 MMOL/L (ref 24–29)
CO2 SERPL-SCNC: 20 MMOL/L (ref 22–29)
CO2 SERPL-SCNC: 21 MMOL/L (ref 22–29)
CO2 SERPL-SCNC: 25 MMOL/L (ref 22–29)
COHGB MFR BLD: 0.8 % (ref 0–2)
COHGB MFR BLD: 0.9 % (ref 0–2)
CREAT SERPL-MCNC: 1.45 MG/DL (ref 0.76–1.27)
CREAT SERPL-MCNC: 1.46 MG/DL (ref 0.76–1.27)
CREAT SERPL-MCNC: 1.51 MG/DL (ref 0.76–1.27)
DEPRECATED RDW RBC AUTO: 48.2 FL (ref 37–54)
DEPRECATED RDW RBC AUTO: 49 FL (ref 37–54)
DEPRECATED RDW RBC AUTO: 49.1 FL (ref 37–54)
EOSINOPHIL # BLD AUTO: 0.52 10*3/MM3 (ref 0–0.4)
EOSINOPHIL NFR BLD AUTO: 3.6 % (ref 0.3–6.2)
EPAP: 0
ERYTHROCYTE [DISTWIDTH] IN BLOOD BY AUTOMATED COUNT: 14.3 % (ref 12.3–15.4)
ERYTHROCYTE [DISTWIDTH] IN BLOOD BY AUTOMATED COUNT: 14.5 % (ref 12.3–15.4)
ERYTHROCYTE [DISTWIDTH] IN BLOOD BY AUTOMATED COUNT: 14.5 % (ref 12.3–15.4)
GFR SERPL CREATININE-BSD FRML MDRD: 47 ML/MIN/1.73
GFR SERPL CREATININE-BSD FRML MDRD: 49 ML/MIN/1.73
GFR SERPL CREATININE-BSD FRML MDRD: 49 ML/MIN/1.73
GLUCOSE BLDC GLUCOMTR-MCNC: 121 MG/DL (ref 70–130)
GLUCOSE BLDC GLUCOMTR-MCNC: 136 MG/DL (ref 70–130)
GLUCOSE BLDC GLUCOMTR-MCNC: 150 MG/DL (ref 70–130)
GLUCOSE BLDC GLUCOMTR-MCNC: 150 MG/DL (ref 70–130)
GLUCOSE BLDC GLUCOMTR-MCNC: 155 MG/DL (ref 70–130)
GLUCOSE BLDC GLUCOMTR-MCNC: 157 MG/DL (ref 70–130)
GLUCOSE BLDC GLUCOMTR-MCNC: 161 MG/DL (ref 70–130)
GLUCOSE BLDC GLUCOMTR-MCNC: 167 MG/DL (ref 70–130)
GLUCOSE BLDC GLUCOMTR-MCNC: 170 MG/DL (ref 70–130)
GLUCOSE BLDC GLUCOMTR-MCNC: 173 MG/DL (ref 70–130)
GLUCOSE BLDC GLUCOMTR-MCNC: 175 MG/DL (ref 70–130)
GLUCOSE BLDC GLUCOMTR-MCNC: 176 MG/DL (ref 70–130)
GLUCOSE BLDC GLUCOMTR-MCNC: 177 MG/DL (ref 70–130)
GLUCOSE BLDC GLUCOMTR-MCNC: 180 MG/DL (ref 70–130)
GLUCOSE BLDC GLUCOMTR-MCNC: 183 MG/DL (ref 70–130)
GLUCOSE BLDC GLUCOMTR-MCNC: 198 MG/DL (ref 70–130)
GLUCOSE SERPL-MCNC: 148 MG/DL (ref 65–99)
GLUCOSE SERPL-MCNC: 172 MG/DL (ref 65–99)
GLUCOSE SERPL-MCNC: 176 MG/DL (ref 65–99)
HCO3 BLDA-SCNC: 21 MMOL/L (ref 22–26)
HCO3 BLDA-SCNC: 22.1 MMOL/L (ref 20–26)
HCO3 BLDA-SCNC: 23.5 MMOL/L (ref 22–26)
HCO3 BLDA-SCNC: 23.8 MMOL/L (ref 22–26)
HCO3 BLDA-SCNC: 23.9 MMOL/L (ref 20–26)
HCO3 BLDA-SCNC: 24.7 MMOL/L (ref 22–26)
HCO3 BLDA-SCNC: 24.8 MMOL/L (ref 22–26)
HCO3 BLDA-SCNC: 27.5 MMOL/L (ref 22–26)
HCO3 BLDA-SCNC: 28.2 MMOL/L (ref 22–26)
HCT VFR BLD AUTO: 31.8 % (ref 37.5–51)
HCT VFR BLD AUTO: 33.1 % (ref 37.5–51)
HCT VFR BLD AUTO: 34.6 % (ref 37.5–51)
HCT VFR BLD CALC: 33.7 %
HCT VFR BLD CALC: 34.1 %
HCT VFR BLDA CALC: 24 % (ref 38–51)
HCT VFR BLDA CALC: 26 % (ref 38–51)
HCT VFR BLDA CALC: 28 % (ref 38–51)
HCT VFR BLDA CALC: 30 % (ref 38–51)
HCT VFR BLDA CALC: 30 % (ref 38–51)
HCT VFR BLDA CALC: 31 % (ref 38–51)
HCT VFR BLDA CALC: 33 % (ref 38–51)
HGB BLD-MCNC: 10.5 G/DL (ref 13–17.7)
HGB BLD-MCNC: 10.9 G/DL (ref 13–17.7)
HGB BLD-MCNC: 11 G/DL (ref 13–17.7)
HGB BLDA-MCNC: 10.2 G/DL (ref 12–17)
HGB BLDA-MCNC: 10.2 G/DL (ref 12–17)
HGB BLDA-MCNC: 10.5 G/DL (ref 12–17)
HGB BLDA-MCNC: 11 G/DL (ref 13.5–17.5)
HGB BLDA-MCNC: 11.1 G/DL (ref 13.5–17.5)
HGB BLDA-MCNC: 11.2 G/DL (ref 12–17)
HGB BLDA-MCNC: 8.2 G/DL (ref 12–17)
HGB BLDA-MCNC: 8.8 G/DL (ref 12–17)
HGB BLDA-MCNC: 9.5 G/DL (ref 12–17)
IMM GRANULOCYTES # BLD AUTO: 0.07 10*3/MM3 (ref 0–0.05)
IMM GRANULOCYTES NFR BLD AUTO: 0.5 % (ref 0–0.5)
INHALED O2 CONCENTRATION: 100 %
INHALED O2 CONCENTRATION: 40 %
INR PPP: 1.49 (ref 0.85–1.16)
IPAP: 0
LYMPHOCYTES # BLD AUTO: 2.26 10*3/MM3 (ref 0.7–3.1)
LYMPHOCYTES NFR BLD AUTO: 15.6 % (ref 19.6–45.3)
Lab: ABNORMAL
MAGNESIUM SERPL-MCNC: 2.4 MG/DL (ref 1.6–2.4)
MAGNESIUM SERPL-MCNC: 2.5 MG/DL (ref 1.6–2.4)
MAGNESIUM SERPL-MCNC: 2.7 MG/DL (ref 1.6–2.4)
MCH RBC QN AUTO: 30 PG (ref 26.6–33)
MCH RBC QN AUTO: 30 PG (ref 26.6–33)
MCH RBC QN AUTO: 30.7 PG (ref 26.6–33)
MCHC RBC AUTO-ENTMCNC: 31.8 G/DL (ref 31.5–35.7)
MCHC RBC AUTO-ENTMCNC: 32.9 G/DL (ref 31.5–35.7)
MCHC RBC AUTO-ENTMCNC: 33 G/DL (ref 31.5–35.7)
MCV RBC AUTO: 91.2 FL (ref 79–97)
MCV RBC AUTO: 93 FL (ref 79–97)
MCV RBC AUTO: 94.3 FL (ref 79–97)
METHGB BLD QL: 0.2 % (ref 0–1.5)
METHGB BLD QL: 0.6 % (ref 0–1.5)
MODALITY: ABNORMAL
MODALITY: ABNORMAL
MONOCYTES # BLD AUTO: 1.54 10*3/MM3 (ref 0.1–0.9)
MONOCYTES NFR BLD AUTO: 10.7 % (ref 5–12)
NEUTROPHILS NFR BLD AUTO: 69.1 % (ref 42.7–76)
NEUTROPHILS NFR BLD AUTO: 9.99 10*3/MM3 (ref 1.7–7)
NOTE: ABNORMAL
NOTE: ABNORMAL
NOTIFIED BY: ABNORMAL
NOTIFIED WHO: ABNORMAL
NRBC BLD AUTO-RTO: 0 /100 WBC (ref 0–0.2)
OXYHGB MFR BLDV: 97.8 % (ref 94–99)
OXYHGB MFR BLDV: ABNORMAL %
PAW @ PEAK INSP FLOW SETTING VENT: 0 CMH2O
PCO2 BLDA: 32.9 MM HG (ref 35–45)
PCO2 BLDA: 34.6 MM HG (ref 35–45)
PCO2 BLDA: 36.9 MM HG (ref 35–45)
PCO2 BLDA: 38.1 MM HG (ref 35–45)
PCO2 BLDA: 41.3 MM HG (ref 35–45)
PCO2 BLDA: 44.7 MM HG (ref 35–45)
PCO2 BLDA: 44.7 MM HG (ref 35–45)
PCO2 BLDA: 46 MM HG (ref 35–45)
PCO2 BLDA: 47.3 MM HG (ref 35–45)
PCO2 TEMP ADJ BLD: 32.9 MM HG (ref 35–48)
PCO2 TEMP ADJ BLD: 41.3 MM HG (ref 35–48)
PEEP RESPIRATORY: 5 CM[H2O]
PEEP RESPIRATORY: 5 CM[H2O]
PH BLDA: 7.31 PH UNITS (ref 7.35–7.6)
PH BLDA: 7.35 PH UNITS (ref 7.35–7.6)
PH BLDA: 7.37 PH UNITS (ref 7.35–7.45)
PH BLDA: 7.39 PH UNITS (ref 7.35–7.6)
PH BLDA: 7.4 PH UNITS (ref 7.35–7.6)
PH BLDA: 7.43 PH UNITS (ref 7.35–7.45)
PH BLDA: 7.43 PH UNITS (ref 7.35–7.6)
PH, TEMP CORRECTED: 7.37 PH UNITS
PH, TEMP CORRECTED: 7.43 PH UNITS
PHOSPHATE SERPL-MCNC: 3.2 MG/DL (ref 2.5–4.5)
PHOSPHATE SERPL-MCNC: 3.4 MG/DL (ref 2.5–4.5)
PHOSPHATE SERPL-MCNC: 4 MG/DL (ref 2.5–4.5)
PLATELET # BLD AUTO: 148 10*3/MM3 (ref 140–450)
PLATELET # BLD AUTO: 165 10*3/MM3 (ref 140–450)
PLATELET # BLD AUTO: 263 10*3/MM3 (ref 140–450)
PMV BLD AUTO: 11.1 FL (ref 6–12)
PMV BLD AUTO: 11.3 FL (ref 6–12)
PMV BLD AUTO: 11.6 FL (ref 6–12)
PO2 BLDA: 132 MM HG (ref 83–108)
PO2 BLDA: 193 MMHG (ref 80–105)
PO2 BLDA: 340 MMHG (ref 80–105)
PO2 BLDA: 348 MMHG (ref 80–105)
PO2 BLDA: 355 MMHG (ref 80–105)
PO2 BLDA: 359 MMHG (ref 80–105)
PO2 BLDA: 434 MM HG (ref 83–108)
PO2 BLDA: 44 MMHG (ref 80–105)
PO2 BLDA: 77 MMHG (ref 80–105)
PO2 TEMP ADJ BLD: 132 MM HG (ref 83–108)
PO2 TEMP ADJ BLD: 434 MM HG (ref 83–108)
POTASSIUM BLDA-SCNC: 3.2 MMOL/L (ref 3.5–4.9)
POTASSIUM BLDA-SCNC: 3.9 MMOL/L (ref 3.5–4.9)
POTASSIUM BLDA-SCNC: 4 MMOL/L (ref 3.5–4.9)
POTASSIUM BLDA-SCNC: 4.1 MMOL/L (ref 3.5–4.9)
POTASSIUM BLDA-SCNC: 4.4 MMOL/L (ref 3.5–4.9)
POTASSIUM BLDA-SCNC: 4.5 MMOL/L (ref 3.5–4.9)
POTASSIUM BLDA-SCNC: 4.7 MMOL/L (ref 3.5–4.9)
POTASSIUM SERPL-SCNC: 3.7 MMOL/L (ref 3.5–5.2)
POTASSIUM SERPL-SCNC: 3.7 MMOL/L (ref 3.5–5.2)
POTASSIUM SERPL-SCNC: 4.1 MMOL/L (ref 3.5–5.2)
POTASSIUM SERPL-SCNC: 4.2 MMOL/L (ref 3.5–5.2)
PROTHROMBIN TIME: 17.7 SECONDS (ref 11.5–14)
PSV: 10 CMH2O
PSV: 10 CMH2O
QT INTERVAL: 386 MS
QTC INTERVAL: 434 MS
RBC # BLD AUTO: 3.42 10*6/MM3 (ref 4.14–5.8)
RBC # BLD AUTO: 3.63 10*6/MM3 (ref 4.14–5.8)
RBC # BLD AUTO: 3.67 10*6/MM3 (ref 4.14–5.8)
SAO2 % BLDA: 100 % (ref 95–98)
SAO2 % BLDA: 75 % (ref 95–98)
SAO2 % BLDA: 95 % (ref 95–98)
SET MECH RESP RATE: 12
SODIUM BLD-SCNC: 139 MMOL/L (ref 138–146)
SODIUM BLD-SCNC: 139 MMOL/L (ref 138–146)
SODIUM BLD-SCNC: 140 MMOL/L (ref 138–146)
SODIUM BLD-SCNC: 141 MMOL/L (ref 138–146)
SODIUM BLD-SCNC: 141 MMOL/L (ref 138–146)
SODIUM SERPL-SCNC: 136 MMOL/L (ref 136–145)
SODIUM SERPL-SCNC: 143 MMOL/L (ref 136–145)
SODIUM SERPL-SCNC: 143 MMOL/L (ref 136–145)
TOTAL RATE: 0 BREATHS/MINUTE
TOTAL RATE: 12 BREATHS/MINUTE
UFH PPP CHRO-ACNC: 0.44 IU/ML (ref 0.3–0.7)
VENTILATOR MODE: ABNORMAL
VT ON VENT VENT: 700 ML
WBC # BLD AUTO: 14.45 10*3/MM3 (ref 3.4–10.8)
WBC # BLD AUTO: 17.45 10*3/MM3 (ref 3.4–10.8)
WBC # BLD AUTO: 18.14 10*3/MM3 (ref 3.4–10.8)

## 2021-03-24 PROCEDURE — 25010000002 PROTAMINE SULFATE PER 10 MG: Performed by: ANESTHESIOLOGY

## 2021-03-24 PROCEDURE — 94799 UNLISTED PULMONARY SVC/PX: CPT

## 2021-03-24 PROCEDURE — 36430 TRANSFUSION BLD/BLD COMPNT: CPT

## 2021-03-24 PROCEDURE — 25010000002 PAPAVERINE PER 60 MG: Performed by: THORACIC SURGERY (CARDIOTHORACIC VASCULAR SURGERY)

## 2021-03-24 PROCEDURE — 25010000002 CEFUROXIME: Performed by: PHYSICIAN ASSISTANT

## 2021-03-24 PROCEDURE — A4648 IMPLANTABLE TISSUE MARKER: HCPCS | Performed by: THORACIC SURGERY (CARDIOTHORACIC VASCULAR SURGERY)

## 2021-03-24 PROCEDURE — 25010000003 AMPICILLIN-SULBACTAM PER 1.5 G: Performed by: PHYSICIAN ASSISTANT

## 2021-03-24 PROCEDURE — 63710000001 INSULIN REGULAR HUMAN PER 5 UNITS: Performed by: PHYSICIAN ASSISTANT

## 2021-03-24 PROCEDURE — C1768 GRAFT, VASCULAR: HCPCS | Performed by: THORACIC SURGERY (CARDIOTHORACIC VASCULAR SURGERY)

## 2021-03-24 PROCEDURE — 82947 ASSAY GLUCOSE BLOOD QUANT: CPT

## 2021-03-24 PROCEDURE — 25010000002 HEPARIN (PORCINE) PER 1000 UNITS: Performed by: THORACIC SURGERY (CARDIOTHORACIC VASCULAR SURGERY)

## 2021-03-24 PROCEDURE — 94660 CPAP INITIATION&MGMT: CPT

## 2021-03-24 PROCEDURE — 33533 CABG ARTERIAL SINGLE: CPT | Performed by: THORACIC SURGERY (CARDIOTHORACIC VASCULAR SURGERY)

## 2021-03-24 PROCEDURE — 25010000002 PROPOFOL 10 MG/ML EMULSION: Performed by: THORACIC SURGERY (CARDIOTHORACIC VASCULAR SURGERY)

## 2021-03-24 PROCEDURE — 25010000003 CEFUROXIME SODIUM 1.5 G RECONSTITUTED SOLUTION: Performed by: PHYSICIAN ASSISTANT

## 2021-03-24 PROCEDURE — 86900 BLOOD TYPING SEROLOGIC ABO: CPT

## 2021-03-24 PROCEDURE — P9016 RBC LEUKOCYTES REDUCED: HCPCS

## 2021-03-24 PROCEDURE — 25010000002 EPINEPHRINE PER 0.1 MG: Performed by: ANESTHESIOLOGY

## 2021-03-24 PROCEDURE — 82803 BLOOD GASES ANY COMBINATION: CPT

## 2021-03-24 PROCEDURE — 25010000002 HEPARIN (PORCINE) 25000-0.45 UT/250ML-% SOLUTION

## 2021-03-24 PROCEDURE — 25010000003 AMPICILLIN-SULBACTAM PER 1.5 G: Performed by: INTERNAL MEDICINE

## 2021-03-24 PROCEDURE — 33518 CABG ARTERY-VEIN TWO: CPT | Performed by: THORACIC SURGERY (CARDIOTHORACIC VASCULAR SURGERY)

## 2021-03-24 PROCEDURE — 85025 COMPLETE CBC W/AUTO DIFF WBC: CPT | Performed by: INTERNAL MEDICINE

## 2021-03-24 PROCEDURE — 85610 PROTHROMBIN TIME: CPT | Performed by: PHYSICIAN ASSISTANT

## 2021-03-24 PROCEDURE — 93005 ELECTROCARDIOGRAM TRACING: CPT | Performed by: PHYSICIAN ASSISTANT

## 2021-03-24 PROCEDURE — 93005 ELECTROCARDIOGRAM TRACING: CPT | Performed by: ANESTHESIOLOGY

## 2021-03-24 PROCEDURE — 82805 BLOOD GASES W/O2 SATURATION: CPT

## 2021-03-24 PROCEDURE — 71045 X-RAY EXAM CHEST 1 VIEW: CPT

## 2021-03-24 PROCEDURE — 25010000002 ALBUMIN HUMAN 5% PER 50 ML: Performed by: PHYSICIAN ASSISTANT

## 2021-03-24 PROCEDURE — 25010000003 CEFUROXIME SODIUM 1.5 G RECONSTITUTED SOLUTION: Performed by: ANESTHESIOLOGY

## 2021-03-24 PROCEDURE — 25010000002 MIDAZOLAM PER 1 MG: Performed by: ANESTHESIOLOGY

## 2021-03-24 PROCEDURE — 84132 ASSAY OF SERUM POTASSIUM: CPT | Performed by: ANESTHESIOLOGY

## 2021-03-24 PROCEDURE — 25010000002 DAPTOMYCIN PER 1 MG: Performed by: PHYSICIAN ASSISTANT

## 2021-03-24 PROCEDURE — 25010000002 PROPOFOL 10 MG/ML EMULSION: Performed by: ANESTHESIOLOGY

## 2021-03-24 PROCEDURE — 82330 ASSAY OF CALCIUM: CPT | Performed by: PHYSICIAN ASSISTANT

## 2021-03-24 PROCEDURE — 74018 RADEX ABDOMEN 1 VIEW: CPT

## 2021-03-24 PROCEDURE — 25010000002 HEPARIN (PORCINE) PER 1000 UNITS: Performed by: ANESTHESIOLOGY

## 2021-03-24 PROCEDURE — C1751 CATH, INF, PER/CENT/MIDLINE: HCPCS | Performed by: THORACIC SURGERY (CARDIOTHORACIC VASCULAR SURGERY)

## 2021-03-24 PROCEDURE — 93318 ECHO TRANSESOPHAGEAL INTRAOP: CPT | Performed by: ANESTHESIOLOGY

## 2021-03-24 PROCEDURE — C1894 INTRO/SHEATH, NON-LASER: HCPCS | Performed by: THORACIC SURGERY (CARDIOTHORACIC VASCULAR SURGERY)

## 2021-03-24 PROCEDURE — 84295 ASSAY OF SERUM SODIUM: CPT

## 2021-03-24 PROCEDURE — 85520 HEPARIN ASSAY: CPT

## 2021-03-24 PROCEDURE — 84132 ASSAY OF SERUM POTASSIUM: CPT

## 2021-03-24 PROCEDURE — 83735 ASSAY OF MAGNESIUM: CPT | Performed by: PHYSICIAN ASSISTANT

## 2021-03-24 PROCEDURE — 33508 ENDOSCOPIC VEIN HARVEST: CPT | Performed by: PHYSICIAN ASSISTANT

## 2021-03-24 PROCEDURE — 33518 CABG ARTERY-VEIN TWO: CPT | Performed by: PHYSICIAN ASSISTANT

## 2021-03-24 PROCEDURE — 82330 ASSAY OF CALCIUM: CPT

## 2021-03-24 PROCEDURE — 80069 RENAL FUNCTION PANEL: CPT | Performed by: INTERNAL MEDICINE

## 2021-03-24 PROCEDURE — 85014 HEMATOCRIT: CPT

## 2021-03-24 PROCEDURE — 021109W BYPASS CORONARY ARTERY, TWO ARTERIES FROM AORTA WITH AUTOLOGOUS VENOUS TISSUE, OPEN APPROACH: ICD-10-PCS | Performed by: THORACIC SURGERY (CARDIOTHORACIC VASCULAR SURGERY)

## 2021-03-24 PROCEDURE — 82375 ASSAY CARBOXYHB QUANT: CPT

## 2021-03-24 PROCEDURE — 63710000001 INSULIN REGULAR HUMAN PER 5 UNITS: Performed by: ANESTHESIOLOGY

## 2021-03-24 PROCEDURE — 85730 THROMBOPLASTIN TIME PARTIAL: CPT | Performed by: PHYSICIAN ASSISTANT

## 2021-03-24 PROCEDURE — 94002 VENT MGMT INPAT INIT DAY: CPT

## 2021-03-24 PROCEDURE — 83735 ASSAY OF MAGNESIUM: CPT | Performed by: INTERNAL MEDICINE

## 2021-03-24 PROCEDURE — 85347 COAGULATION TIME ACTIVATED: CPT

## 2021-03-24 PROCEDURE — 25010000002 FENTANYL CITRATE (PF) 100 MCG/2ML SOLUTION: Performed by: THORACIC SURGERY (CARDIOTHORACIC VASCULAR SURGERY)

## 2021-03-24 PROCEDURE — 93318 ECHO TRANSESOPHAGEAL INTRAOP: CPT

## 2021-03-24 PROCEDURE — 33533 CABG ARTERIAL SINGLE: CPT | Performed by: PHYSICIAN ASSISTANT

## 2021-03-24 PROCEDURE — 80069 RENAL FUNCTION PANEL: CPT | Performed by: PHYSICIAN ASSISTANT

## 2021-03-24 PROCEDURE — 06BP4ZZ EXCISION OF RIGHT SAPHENOUS VEIN, PERCUTANEOUS ENDOSCOPIC APPROACH: ICD-10-PCS | Performed by: THORACIC SURGERY (CARDIOTHORACIC VASCULAR SURGERY)

## 2021-03-24 PROCEDURE — 25810000003 DEXTROSE 5 % WITH KCL 20 MEQ 20-5 MEQ/L-% SOLUTION: Performed by: PHYSICIAN ASSISTANT

## 2021-03-24 PROCEDURE — 99232 SBSQ HOSP IP/OBS MODERATE 35: CPT | Performed by: INTERNAL MEDICINE

## 2021-03-24 PROCEDURE — 5A1221Z PERFORMANCE OF CARDIAC OUTPUT, CONTINUOUS: ICD-10-PCS | Performed by: THORACIC SURGERY (CARDIOTHORACIC VASCULAR SURGERY)

## 2021-03-24 PROCEDURE — 25010000002 PROTAMINE SULFATE PER 10 MG: Performed by: PHYSICIAN ASSISTANT

## 2021-03-24 PROCEDURE — 02100Z9 BYPASS CORONARY ARTERY, ONE ARTERY FROM LEFT INTERNAL MAMMARY, OPEN APPROACH: ICD-10-PCS | Performed by: THORACIC SURGERY (CARDIOTHORACIC VASCULAR SURGERY)

## 2021-03-24 PROCEDURE — 85027 COMPLETE CBC AUTOMATED: CPT | Performed by: PHYSICIAN ASSISTANT

## 2021-03-24 PROCEDURE — P9041 ALBUMIN (HUMAN),5%, 50ML: HCPCS | Performed by: PHYSICIAN ASSISTANT

## 2021-03-24 PROCEDURE — C1751 CATH, INF, PER/CENT/MIDLINE: HCPCS | Performed by: ANESTHESIOLOGY

## 2021-03-24 PROCEDURE — 83050 HGB METHEMOGLOBIN QUAN: CPT

## 2021-03-24 PROCEDURE — 33508 ENDOSCOPIC VEIN HARVEST: CPT | Performed by: THORACIC SURGERY (CARDIOTHORACIC VASCULAR SURGERY)

## 2021-03-24 DEVICE — DISK-SHAPED STYLE, SILICONE (1 PER STERILE PKG)
Type: IMPLANTABLE DEVICE | Site: LEG | Status: FUNCTIONAL
Brand: SCANLAN® RADIOMARK® GRAFT MARKERS

## 2021-03-24 DEVICE — LIGACLIP MCA MULTIPLE CLIP APPLIERS, 20 SMALL CLIPS
Type: IMPLANTABLE DEVICE | Site: LEG | Status: FUNCTIONAL
Brand: LIGACLIP

## 2021-03-24 RX ORDER — ASPIRIN 81 MG/1
81 TABLET, CHEWABLE ORAL DAILY
COMMUNITY

## 2021-03-24 RX ORDER — HEPARIN SODIUM 1000 [USP'U]/ML
INJECTION, SOLUTION INTRAVENOUS; SUBCUTANEOUS AS NEEDED
Status: DISCONTINUED | OUTPATIENT
Start: 2021-03-24 | End: 2021-03-24 | Stop reason: SURG

## 2021-03-24 RX ORDER — HYDROCHLOROTHIAZIDE 25 MG/1
25 TABLET ORAL DAILY
COMMUNITY
End: 2021-04-01 | Stop reason: HOSPADM

## 2021-03-24 RX ORDER — NITROGLYCERIN 20 MG/100ML
5-200 INJECTION INTRAVENOUS CONTINUOUS PRN
Status: DISCONTINUED | OUTPATIENT
Start: 2021-03-24 | End: 2021-03-29

## 2021-03-24 RX ORDER — AMLODIPINE BESYLATE 10 MG/1
10 TABLET ORAL DAILY
COMMUNITY
End: 2021-04-01 | Stop reason: HOSPADM

## 2021-03-24 RX ORDER — ALBUTEROL SULFATE 2.5 MG/3ML
2.5 SOLUTION RESPIRATORY (INHALATION) EVERY 4 HOURS PRN
Status: DISCONTINUED | OUTPATIENT
Start: 2021-03-24 | End: 2021-03-24 | Stop reason: SDUPTHER

## 2021-03-24 RX ORDER — METOPROLOL TARTRATE 5 MG/5ML
2.5 INJECTION INTRAVENOUS EVERY 6 HOURS SCHEDULED
Status: DISPENSED | OUTPATIENT
Start: 2021-03-24 | End: 2021-03-25

## 2021-03-24 RX ORDER — HYDROCODONE BITARTRATE AND ACETAMINOPHEN 7.5; 325 MG/1; MG/1
1 TABLET ORAL EVERY 4 HOURS PRN
Status: DISCONTINUED | OUTPATIENT
Start: 2021-03-24 | End: 2021-04-01 | Stop reason: HOSPADM

## 2021-03-24 RX ORDER — SODIUM CHLORIDE 0.9 % (FLUSH) 0.9 %
10 SYRINGE (ML) INJECTION AS NEEDED
Status: DISCONTINUED | OUTPATIENT
Start: 2021-03-24 | End: 2021-03-24 | Stop reason: HOSPADM

## 2021-03-24 RX ORDER — SODIUM CHLORIDE, SODIUM LACTATE, POTASSIUM CHLORIDE, CALCIUM CHLORIDE 600; 310; 30; 20 MG/100ML; MG/100ML; MG/100ML; MG/100ML
9 INJECTION, SOLUTION INTRAVENOUS CONTINUOUS
Status: DISCONTINUED | OUTPATIENT
Start: 2021-03-24 | End: 2021-03-24

## 2021-03-24 RX ORDER — ETOMIDATE 2 MG/ML
INJECTION INTRAVENOUS AS NEEDED
Status: DISCONTINUED | OUTPATIENT
Start: 2021-03-24 | End: 2021-03-24 | Stop reason: SURG

## 2021-03-24 RX ORDER — LIDOCAINE HYDROCHLORIDE 10 MG/ML
0.5 INJECTION, SOLUTION EPIDURAL; INFILTRATION; INTRACAUDAL; PERINEURAL ONCE AS NEEDED
Status: DISCONTINUED | OUTPATIENT
Start: 2021-03-24 | End: 2021-03-24 | Stop reason: HOSPADM

## 2021-03-24 RX ORDER — ALBUTEROL SULFATE 90 UG/1
2 AEROSOL, METERED RESPIRATORY (INHALATION) EVERY 4 HOURS PRN
COMMUNITY

## 2021-03-24 RX ORDER — CHLORHEXIDINE GLUCONATE 0.12 MG/ML
15 RINSE ORAL EVERY 12 HOURS SCHEDULED
Status: DISCONTINUED | OUTPATIENT
Start: 2021-03-24 | End: 2021-03-25

## 2021-03-24 RX ORDER — CHLORHEXIDINE GLUCONATE 0.12 MG/ML
15 RINSE ORAL EVERY 12 HOURS SCHEDULED
Status: DISCONTINUED | OUTPATIENT
Start: 2021-03-24 | End: 2021-03-24 | Stop reason: SDUPTHER

## 2021-03-24 RX ORDER — ASPIRIN 325 MG
325 TABLET, DELAYED RELEASE (ENTERIC COATED) ORAL DAILY
Status: DISCONTINUED | OUTPATIENT
Start: 2021-03-25 | End: 2021-04-01 | Stop reason: HOSPADM

## 2021-03-24 RX ORDER — ALBUTEROL SULFATE 2.5 MG/3ML
2.5 SOLUTION RESPIRATORY (INHALATION) EVERY 4 HOURS PRN
Status: ACTIVE | OUTPATIENT
Start: 2021-03-24 | End: 2021-03-25

## 2021-03-24 RX ORDER — ATORVASTATIN CALCIUM 40 MG/1
40 TABLET, FILM COATED ORAL NIGHTLY
Status: DISCONTINUED | OUTPATIENT
Start: 2021-03-24 | End: 2021-04-01 | Stop reason: HOSPADM

## 2021-03-24 RX ORDER — SODIUM CHLORIDE 0.9 % (FLUSH) 0.9 %
30 SYRINGE (ML) INJECTION ONCE AS NEEDED
Status: DISCONTINUED | OUTPATIENT
Start: 2021-03-24 | End: 2021-03-25

## 2021-03-24 RX ORDER — FENTANYL CITRATE 50 UG/ML
25 INJECTION, SOLUTION INTRAMUSCULAR; INTRAVENOUS
Status: DISCONTINUED | OUTPATIENT
Start: 2021-03-24 | End: 2021-03-25

## 2021-03-24 RX ORDER — PROTAMINE SULFATE 10 MG/ML
INJECTION, SOLUTION INTRAVENOUS AS NEEDED
Status: DISCONTINUED | OUTPATIENT
Start: 2021-03-24 | End: 2021-03-24 | Stop reason: SURG

## 2021-03-24 RX ORDER — POTASSIUM CHLORIDE, DEXTROSE MONOHYDRATE 150; 5 MG/100ML; G/100ML
30 INJECTION, SOLUTION INTRAVENOUS CONTINUOUS
Status: DISCONTINUED | OUTPATIENT
Start: 2021-03-24 | End: 2021-03-25

## 2021-03-24 RX ORDER — POTASSIUM CHLORIDE 1.5 G/1.77G
40 POWDER, FOR SOLUTION ORAL AS NEEDED
Status: DISCONTINUED | OUTPATIENT
Start: 2021-03-24 | End: 2021-04-01 | Stop reason: HOSPADM

## 2021-03-24 RX ORDER — POTASSIUM CHLORIDE 29.8 MG/ML
20 INJECTION INTRAVENOUS
Status: DISCONTINUED | OUTPATIENT
Start: 2021-03-24 | End: 2021-03-29

## 2021-03-24 RX ORDER — LIRAGLUTIDE 6 MG/ML
1.2 INJECTION SUBCUTANEOUS DAILY
COMMUNITY

## 2021-03-24 RX ORDER — ATORVASTATIN CALCIUM 40 MG/1
40 TABLET, FILM COATED ORAL NIGHTLY
COMMUNITY

## 2021-03-24 RX ORDER — PHENYLEPHRINE HCL IN 0.9% NACL 0.5 MG/5ML
.5-3 SYRINGE (ML) INTRAVENOUS CONTINUOUS PRN
Status: DISCONTINUED | OUTPATIENT
Start: 2021-03-24 | End: 2021-03-29

## 2021-03-24 RX ORDER — LIDOCAINE HYDROCHLORIDE 20 MG/ML
INJECTION, SOLUTION INFILTRATION; PERINEURAL AS NEEDED
Status: DISCONTINUED | OUTPATIENT
Start: 2021-03-24 | End: 2021-03-24 | Stop reason: SURG

## 2021-03-24 RX ORDER — NOREPINEPHRINE BIT/0.9 % NACL 8 MG/250ML
.02-.3 INFUSION BOTTLE (ML) INTRAVENOUS CONTINUOUS PRN
Status: DISCONTINUED | OUTPATIENT
Start: 2021-03-24 | End: 2021-03-29

## 2021-03-24 RX ORDER — ASPIRIN 325 MG
325 TABLET ORAL ONCE
Status: COMPLETED | OUTPATIENT
Start: 2021-03-24 | End: 2021-03-24

## 2021-03-24 RX ORDER — PAPAVERINE HYDROCHLORIDE 30 MG/ML
INJECTION INTRAMUSCULAR; INTRAVENOUS AS NEEDED
Status: DISCONTINUED | OUTPATIENT
Start: 2021-03-24 | End: 2021-03-24 | Stop reason: HOSPADM

## 2021-03-24 RX ORDER — PAROXETINE HYDROCHLORIDE HEMIHYDRATE 37.5 MG/1
37.5 TABLET, FILM COATED, EXTENDED RELEASE ORAL EVERY EVENING
COMMUNITY

## 2021-03-24 RX ORDER — MORPHINE SULFATE 2 MG/ML
2 INJECTION, SOLUTION INTRAMUSCULAR; INTRAVENOUS
Status: DISCONTINUED | OUTPATIENT
Start: 2021-03-24 | End: 2021-03-25

## 2021-03-24 RX ORDER — ROCURONIUM BROMIDE 10 MG/ML
INJECTION, SOLUTION INTRAVENOUS AS NEEDED
Status: DISCONTINUED | OUTPATIENT
Start: 2021-03-24 | End: 2021-03-24 | Stop reason: SURG

## 2021-03-24 RX ORDER — SODIUM CHLORIDE 9 MG/ML
INJECTION, SOLUTION INTRAVENOUS CONTINUOUS PRN
Status: DISCONTINUED | OUTPATIENT
Start: 2021-03-24 | End: 2021-03-24 | Stop reason: SURG

## 2021-03-24 RX ORDER — SODIUM CHLORIDE 9 MG/ML
30 INJECTION, SOLUTION INTRAVENOUS CONTINUOUS PRN
Status: DISCONTINUED | OUTPATIENT
Start: 2021-03-24 | End: 2021-03-25

## 2021-03-24 RX ORDER — CALCIUM CHLORIDE 100 MG/ML
INJECTION INTRAVENOUS; INTRAVENTRICULAR AS NEEDED
Status: DISCONTINUED | OUTPATIENT
Start: 2021-03-24 | End: 2021-03-24 | Stop reason: SURG

## 2021-03-24 RX ORDER — FLUTICASONE PROPIONATE 50 MCG
2 SPRAY, SUSPENSION (ML) NASAL DAILY PRN
COMMUNITY

## 2021-03-24 RX ORDER — SODIUM CHLORIDE 9 MG/ML
INJECTION, SOLUTION INTRAVENOUS AS NEEDED
Status: DISCONTINUED | OUTPATIENT
Start: 2021-03-24 | End: 2021-03-24 | Stop reason: HOSPADM

## 2021-03-24 RX ORDER — MIDAZOLAM HYDROCHLORIDE 1 MG/ML
2 INJECTION INTRAMUSCULAR; INTRAVENOUS
Status: DISCONTINUED | OUTPATIENT
Start: 2021-03-24 | End: 2021-03-24 | Stop reason: HOSPADM

## 2021-03-24 RX ORDER — NOREPINEPHRINE BITARTRATE 1 MG/ML
INJECTION, SOLUTION INTRAVENOUS CONTINUOUS PRN
Status: DISCONTINUED | OUTPATIENT
Start: 2021-03-24 | End: 2021-03-24

## 2021-03-24 RX ORDER — ZOLPIDEM TARTRATE 10 MG/1
10 TABLET ORAL NIGHTLY
Status: ON HOLD | COMMUNITY
End: 2021-04-01 | Stop reason: SDUPTHER

## 2021-03-24 RX ORDER — MIDAZOLAM HYDROCHLORIDE 1 MG/ML
INJECTION INTRAMUSCULAR; INTRAVENOUS AS NEEDED
Status: DISCONTINUED | OUTPATIENT
Start: 2021-03-24 | End: 2021-03-24 | Stop reason: SURG

## 2021-03-24 RX ORDER — SUFENTANIL CITRATE 50 UG/ML
INJECTION EPIDURAL; INTRAVENOUS AS NEEDED
Status: DISCONTINUED | OUTPATIENT
Start: 2021-03-24 | End: 2021-03-24 | Stop reason: SURG

## 2021-03-24 RX ORDER — DOPAMINE HYDROCHLORIDE 160 MG/100ML
2-20 INJECTION, SOLUTION INTRAVENOUS CONTINUOUS PRN
Status: DISCONTINUED | OUTPATIENT
Start: 2021-03-24 | End: 2021-03-25

## 2021-03-24 RX ORDER — FAMOTIDINE 20 MG/1
20 TABLET, FILM COATED ORAL ONCE
Status: COMPLETED | OUTPATIENT
Start: 2021-03-24 | End: 2021-03-24

## 2021-03-24 RX ORDER — DEXMEDETOMIDINE HYDROCHLORIDE 4 UG/ML
.2-1.5 INJECTION, SOLUTION INTRAVENOUS CONTINUOUS PRN
Status: DISCONTINUED | OUTPATIENT
Start: 2021-03-24 | End: 2021-03-25

## 2021-03-24 RX ORDER — ONDANSETRON 2 MG/ML
4 INJECTION INTRAMUSCULAR; INTRAVENOUS EVERY 6 HOURS PRN
Status: DISCONTINUED | OUTPATIENT
Start: 2021-03-24 | End: 2021-04-01 | Stop reason: HOSPADM

## 2021-03-24 RX ORDER — MIDAZOLAM HYDROCHLORIDE 1 MG/ML
1 INJECTION INTRAMUSCULAR; INTRAVENOUS
Status: DISCONTINUED | OUTPATIENT
Start: 2021-03-24 | End: 2021-03-24 | Stop reason: HOSPADM

## 2021-03-24 RX ORDER — VENLAFAXINE 75 MG/1
75 TABLET ORAL
COMMUNITY

## 2021-03-24 RX ORDER — QUINAPRIL 20 MG/1
40 TABLET ORAL 2 TIMES DAILY
COMMUNITY
End: 2021-04-01 | Stop reason: HOSPADM

## 2021-03-24 RX ORDER — ROPINIROLE 0.5 MG/1
0.5 TABLET, FILM COATED ORAL NIGHTLY
COMMUNITY

## 2021-03-24 RX ORDER — DOBUTAMINE HYDROCHLORIDE 100 MG/100ML
2-20 INJECTION INTRAVENOUS CONTINUOUS PRN
Status: DISCONTINUED | OUTPATIENT
Start: 2021-03-24 | End: 2021-03-25

## 2021-03-24 RX ORDER — ALBUMIN, HUMAN INJ 5% 5 %
500 SOLUTION INTRAVENOUS AS NEEDED
Status: COMPLETED | OUTPATIENT
Start: 2021-03-24 | End: 2021-03-25

## 2021-03-24 RX ORDER — ALPRAZOLAM 0.5 MG/1
0.5 TABLET ORAL ONCE
Status: COMPLETED | OUTPATIENT
Start: 2021-03-24 | End: 2021-03-24

## 2021-03-24 RX ORDER — AMINOCAPROIC ACID 250 MG/ML
INJECTION, SOLUTION INTRAVENOUS AS NEEDED
Status: DISCONTINUED | OUTPATIENT
Start: 2021-03-24 | End: 2021-03-24 | Stop reason: SURG

## 2021-03-24 RX ORDER — NALOXONE HCL 0.4 MG/ML
0.4 VIAL (ML) INJECTION
Status: DISCONTINUED | OUTPATIENT
Start: 2021-03-24 | End: 2021-04-01 | Stop reason: HOSPADM

## 2021-03-24 RX ORDER — VECURONIUM BROMIDE 1 MG/ML
INJECTION, POWDER, LYOPHILIZED, FOR SOLUTION INTRAVENOUS AS NEEDED
Status: DISCONTINUED | OUTPATIENT
Start: 2021-03-24 | End: 2021-03-24 | Stop reason: SURG

## 2021-03-24 RX ORDER — SACCHAROMYCES BOULARDII 250 MG
250 CAPSULE ORAL DAILY
COMMUNITY

## 2021-03-24 RX ORDER — MAGNESIUM HYDROXIDE 1200 MG/15ML
LIQUID ORAL AS NEEDED
Status: DISCONTINUED | OUTPATIENT
Start: 2021-03-24 | End: 2021-03-24 | Stop reason: HOSPADM

## 2021-03-24 RX ORDER — MEPERIDINE HYDROCHLORIDE 25 MG/ML
25 INJECTION INTRAMUSCULAR; INTRAVENOUS; SUBCUTANEOUS EVERY 4 HOURS PRN
Status: ACTIVE | OUTPATIENT
Start: 2021-03-24 | End: 2021-03-25

## 2021-03-24 RX ORDER — OXYCODONE HYDROCHLORIDE AND ACETAMINOPHEN 5; 325 MG/1; MG/1
2 TABLET ORAL EVERY 4 HOURS PRN
Status: DISCONTINUED | OUTPATIENT
Start: 2021-03-24 | End: 2021-04-01 | Stop reason: HOSPADM

## 2021-03-24 RX ORDER — GLYCOPYRROLATE 0.2 MG/ML
INJECTION INTRAMUSCULAR; INTRAVENOUS AS NEEDED
Status: DISCONTINUED | OUTPATIENT
Start: 2021-03-24 | End: 2021-03-24 | Stop reason: SURG

## 2021-03-24 RX ORDER — POTASSIUM CHLORIDE 750 MG/1
40 CAPSULE, EXTENDED RELEASE ORAL AS NEEDED
Status: DISCONTINUED | OUTPATIENT
Start: 2021-03-24 | End: 2021-04-01 | Stop reason: HOSPADM

## 2021-03-24 RX ORDER — SODIUM CHLORIDE 0.9 % (FLUSH) 0.9 %
10 SYRINGE (ML) INJECTION EVERY 12 HOURS SCHEDULED
Status: DISCONTINUED | OUTPATIENT
Start: 2021-03-24 | End: 2021-03-24 | Stop reason: HOSPADM

## 2021-03-24 RX ORDER — PROTAMINE SULFATE 10 MG/ML
50 INJECTION, SOLUTION INTRAVENOUS ONCE
Status: COMPLETED | OUTPATIENT
Start: 2021-03-24 | End: 2021-03-24

## 2021-03-24 RX ORDER — CEFUROXIME SODIUM 1.5 G/16ML
INJECTION, POWDER, FOR SOLUTION INTRAVENOUS AS NEEDED
Status: DISCONTINUED | OUTPATIENT
Start: 2021-03-24 | End: 2021-03-24 | Stop reason: SURG

## 2021-03-24 RX ADMIN — ROCURONIUM BROMIDE 100 MG: 10 INJECTION INTRAVENOUS at 12:55

## 2021-03-24 RX ADMIN — ASPIRIN 325 MG ORAL TABLET 325 MG: 325 PILL ORAL at 20:10

## 2021-03-24 RX ADMIN — MIDAZOLAM HYDROCHLORIDE 1 MG: 1 INJECTION, SOLUTION INTRAMUSCULAR; INTRAVENOUS at 08:12

## 2021-03-24 RX ADMIN — ASPIRIN 325 MG: 325 TABLET, COATED ORAL at 08:11

## 2021-03-24 RX ADMIN — SODIUM CHLORIDE: 9 INJECTION, SOLUTION INTRAVENOUS at 13:08

## 2021-03-24 RX ADMIN — SODIUM CHLORIDE, POTASSIUM CHLORIDE, SODIUM LACTATE AND CALCIUM CHLORIDE 9 ML/HR: 600; 310; 30; 20 INJECTION, SOLUTION INTRAVENOUS at 08:53

## 2021-03-24 RX ADMIN — CHLORHEXIDINE GLUCONATE 15 ML: 1.2 SOLUTION ORAL at 20:19

## 2021-03-24 RX ADMIN — ATORVASTATIN CALCIUM 40 MG: 40 TABLET, FILM COATED ORAL at 20:12

## 2021-03-24 RX ADMIN — LIDOCAINE HYDROCHLORIDE 100 MG: 20 INJECTION, SOLUTION INFILTRATION; PERINEURAL at 12:55

## 2021-03-24 RX ADMIN — POTASSIUM CHLORIDE AND DEXTROSE MONOHYDRATE 30 ML/HR: 150; 5 INJECTION, SOLUTION INTRAVENOUS at 17:37

## 2021-03-24 RX ADMIN — PROTAMINE SULFATE 50 MG: 10 INJECTION, SOLUTION INTRAVENOUS at 17:45

## 2021-03-24 RX ADMIN — PROPOFOL 25 MCG/KG/MIN: 10 INJECTION, EMULSION INTRAVENOUS at 16:38

## 2021-03-24 RX ADMIN — AMINOCAPROIC ACID 10 G: 250 INJECTION, SOLUTION INTRAVENOUS at 16:05

## 2021-03-24 RX ADMIN — SUFENTANIL CITRATE 50 MCG: 50 INJECTION EPIDURAL; INTRAVENOUS at 15:58

## 2021-03-24 RX ADMIN — HEPARIN SODIUM 10000 UNITS: 1000 INJECTION, SOLUTION INTRAVENOUS; SUBCUTANEOUS at 14:28

## 2021-03-24 RX ADMIN — EPINEPHRINE 0.05 MCG/KG/MIN: 1 INJECTION, SOLUTION, CONCENTRATE INTRAVENOUS at 15:54

## 2021-03-24 RX ADMIN — CEFUROXIME SODIUM 1.5 G: 1.5 INJECTION, POWDER, FOR SOLUTION INTRAVENOUS at 13:08

## 2021-03-24 RX ADMIN — NOREPINEPHRINE BITARTRATE 0.05 MCG/KG/MIN: 1 INJECTION, SOLUTION, CONCENTRATE INTRAVENOUS at 13:31

## 2021-03-24 RX ADMIN — GLYCOPYRROLATE 0.2 MG: 0.4 INJECTION INTRAMUSCULAR; INTRAVENOUS at 16:08

## 2021-03-24 RX ADMIN — ALPRAZOLAM 0.5 MG: 0.5 TABLET ORAL at 10:49

## 2021-03-24 RX ADMIN — HEPARIN SODIUM 40000 UNITS: 1000 INJECTION, SOLUTION INTRAVENOUS; SUBCUTANEOUS at 14:17

## 2021-03-24 RX ADMIN — HEPARIN SODIUM 17 UNITS/KG/HR: 10000 INJECTION, SOLUTION INTRAVENOUS at 06:18

## 2021-03-24 RX ADMIN — SODIUM CHLORIDE, PRESERVATIVE FREE 10 ML: 5 INJECTION INTRAVENOUS at 08:22

## 2021-03-24 RX ADMIN — CEFUROXIME 1.5 G: 1.5 INJECTION, POWDER, FOR SOLUTION INTRAVENOUS at 12:40

## 2021-03-24 RX ADMIN — MIDAZOLAM HYDROCHLORIDE 2 MG: 1 INJECTION, SOLUTION INTRAMUSCULAR; INTRAVENOUS at 12:55

## 2021-03-24 RX ADMIN — SUFENTANIL CITRATE 50 MCG: 50 INJECTION EPIDURAL; INTRAVENOUS at 12:55

## 2021-03-24 RX ADMIN — DAPTOMYCIN 550 MG: 500 INJECTION, POWDER, LYOPHILIZED, FOR SOLUTION INTRAVENOUS at 18:49

## 2021-03-24 RX ADMIN — PAROXETINE HYDROCHLORIDE 40 MG: 20 TABLET, FILM COATED ORAL at 08:11

## 2021-03-24 RX ADMIN — CEFUROXIME SODIUM 1.5 G: 1.5 INJECTION, POWDER, FOR SOLUTION INTRAVENOUS at 16:05

## 2021-03-24 RX ADMIN — FENTANYL CITRATE 25 MCG: 50 INJECTION, SOLUTION INTRAMUSCULAR; INTRAVENOUS at 22:15

## 2021-03-24 RX ADMIN — FAMOTIDINE 20 MG: 20 TABLET, FILM COATED ORAL at 08:11

## 2021-03-24 RX ADMIN — DEXMEDETOMIDINE HYDROCHLORIDE 0.2 MCG/KG/HR: 4 INJECTION, SOLUTION INTRAVENOUS at 22:05

## 2021-03-24 RX ADMIN — CASTOR OIL AND BALSAM, PERU: 788; 87 OINTMENT TOPICAL at 08:11

## 2021-03-24 RX ADMIN — SUFENTANIL CITRATE 50 MCG: 50 INJECTION EPIDURAL; INTRAVENOUS at 16:07

## 2021-03-24 RX ADMIN — AMINOCAPROIC ACID 10 G: 250 INJECTION, SOLUTION INTRAVENOUS at 13:20

## 2021-03-24 RX ADMIN — PROPOFOL 50 MCG/KG/MIN: 10 INJECTION, EMULSION INTRAVENOUS at 20:05

## 2021-03-24 RX ADMIN — SODIUM CHLORIDE 3 G: 900 INJECTION INTRAVENOUS at 10:53

## 2021-03-24 RX ADMIN — FENTANYL CITRATE 25 MCG: 50 INJECTION, SOLUTION INTRAMUSCULAR; INTRAVENOUS at 20:19

## 2021-03-24 RX ADMIN — SUFENTANIL CITRATE 50 MCG: 50 INJECTION EPIDURAL; INTRAVENOUS at 14:37

## 2021-03-24 RX ADMIN — CEFUROXIME SODIUM 1.5 G: 1.5 INJECTION, POWDER, FOR SOLUTION INTRAVENOUS at 20:05

## 2021-03-24 RX ADMIN — SUFENTANIL CITRATE 50 MCG: 50 INJECTION EPIDURAL; INTRAVENOUS at 13:36

## 2021-03-24 RX ADMIN — SODIUM CHLORIDE 3 G: 900 INJECTION INTRAVENOUS at 06:17

## 2021-03-24 RX ADMIN — PANTOPRAZOLE SODIUM 40 MG: 40 INJECTION, POWDER, FOR SOLUTION INTRAVENOUS at 06:24

## 2021-03-24 RX ADMIN — METOPROLOL TARTRATE 12.5 MG: 25 TABLET, FILM COATED ORAL at 12:40

## 2021-03-24 RX ADMIN — OXYCODONE HYDROCHLORIDE AND ACETAMINOPHEN 2 TABLET: 5; 325 TABLET ORAL at 22:14

## 2021-03-24 RX ADMIN — NITROGLYCERIN 5 MCG/MIN: 20 INJECTION INTRAVENOUS at 17:37

## 2021-03-24 RX ADMIN — VECURONIUM BROMIDE 10 MG: 1 INJECTION, POWDER, LYOPHILIZED, FOR SOLUTION INTRAVENOUS at 14:37

## 2021-03-24 RX ADMIN — ETOMIDATE 6 MG: 2 INJECTION, SOLUTION INTRAVENOUS at 12:55

## 2021-03-24 RX ADMIN — ALBUMIN HUMAN 500 ML: 0.05 INJECTION, SOLUTION INTRAVENOUS at 18:48

## 2021-03-24 RX ADMIN — SODIUM CHLORIDE 3 G: 900 INJECTION INTRAVENOUS at 00:51

## 2021-03-24 RX ADMIN — PROTAMINE SULFATE 500 MG: 10 INJECTION, SOLUTION INTRAVENOUS at 16:05

## 2021-03-24 RX ADMIN — SODIUM CHLORIDE 3 G: 900 INJECTION INTRAVENOUS at 18:49

## 2021-03-24 RX ADMIN — SODIUM CHLORIDE 2.4 UNITS/HR: 9 INJECTION, SOLUTION INTRAVENOUS at 20:12

## 2021-03-24 RX ADMIN — CASTOR OIL AND BALSAM, PERU: 788; 87 OINTMENT TOPICAL at 22:05

## 2021-03-24 RX ADMIN — MUPIROCIN 1 APPLICATION: 20 OINTMENT TOPICAL at 06:18

## 2021-03-24 RX ADMIN — CALCIUM CHLORIDE 1 G: 100 INJECTION INTRAVENOUS; INTRAVENTRICULAR at 16:05

## 2021-03-24 RX ADMIN — SODIUM CHLORIDE 7 UNITS/HR: 9 INJECTION, SOLUTION INTRAVENOUS at 13:35

## 2021-03-24 RX ADMIN — HYDROCODONE BITARTRATE AND ACETAMINOPHEN 1 TABLET: 7.5; 325 TABLET ORAL at 23:22

## 2021-03-24 NOTE — ANESTHESIA PROCEDURE NOTES
Arterial Line      Patient reassessed immediately prior to procedure    Patient location during procedure: pre-op  Start time: 3/24/2021 12:30 PM  Stop Time:3/24/2021 12:35 PM       Line placed for hemodynamic monitoring.  Performed By   Anesthesiologist: Star Bueno MD  Preanesthetic Checklist  Completed: patient identified, IV checked, site marked, risks and benefits discussed, surgical consent, monitors and equipment checked, pre-op evaluation and timeout performed  Arterial Line Prep   Sterile Tech: cap, gloves and sterile barriers  Prep: ChloraPrep  Patient monitoring: blood pressure monitoring, continuous pulse oximetry and EKG  Arterial Line Procedure   Laterality:left  Location:  radial artery  Catheter size: 20 G   Guidance: ultrasound guided  Number of attempts: 1  Successful placement: yes  Post Assessment   Dressing Type: line sutured, occlusive dressing applied, secured with tape and wrist guard applied.   Complications no  Circ/Move/Sens Assessment: normal and unchanged.   Patient Tolerance: patient tolerated the procedure well with no apparent complications

## 2021-03-24 NOTE — ANESTHESIA PROCEDURE NOTES
Central Line      Patient reassessed immediately prior to procedure    Patient location during procedure: OR  Indications: vascular access  Staff  Anesthesiologist: Michel Schrader MD  Preanesthetic Checklist  Completed: patient identified, IV checked, site marked, risks and benefits discussed, surgical consent, monitors and equipment checked, pre-op evaluation and timeout performed  Central Line Prep  Sterile Tech:cap, gloves, gown, mask and sterile barriers  Prep: chloraprep  Patient monitoring: blood pressure monitoring, continuous pulse oximetry and EKG  Central Line Procedure  Laterality:right  Location:internal jugular  Catheter Type:Cordis, MAC and Mohler-Addie  Catheter Size:9 Fr  Guidance:landmark technique and palpation technique  PROCEDURE NOTE/ULTRASOUND INTERPRETATION.  Using ultrasound guidance the potential vascular sites for insertion of the catheter were visualized to determine the patency of the vessel to be used for vascular access.  After selecting the appropriate site for insertion, the needle was visualized under ultrasound being inserted into the internal jugular vein, followed by ultrasound confirmation of wire and catheter placement. There were no abnormalities seen on ultrasound; an image was taken; and the patient tolerated the procedure with no complications. Images: still images obtained, printed/placed on chart  Assessment  Post procedure:biopatch applied, line sutured, occlusive dressing applied and secured with tape  Assessement:blood return through all ports, free fluid flow, chest x-ray ordered and Travis Test  Complications:no  Patient Tolerance:patient tolerated the procedure well with no apparent complications

## 2021-03-24 NOTE — ANESTHESIA POSTPROCEDURE EVALUATION
Patient: Dion De La Vega    Procedure Summary     Date: 03/24/21 Room / Location:  [facility]  [facility]    Anesthesia Start: 1248 Anesthesia Stop: 1716    Procedure: MEDIAN STERNOTMY,CORONARY ARTERY BYPASS GRAFTING X 3 with utilization of left mammary artery and ENDOSCOPIC VEIN HARVEST OF THE RIGHT GREATER SAPHENOUS VEIN, BREANNE (N/A Chest) Diagnosis:       Multiple vessel coronary artery disease      (Multiple vessel coronary artery disease [I25.10])    Surgeons: Arjun Kee MD Provider: Michel Schrader MD    Anesthesia Type: general ASA Status: 4          Anesthesia Type: general    Vitals  Vitals Value Taken Time   /50 03/24/21 1716   Temp 97.9 °F (36.6 °C) 03/24/21 1716   Pulse 80 03/24/21 1716   Resp     SpO2 100 % 03/24/21 1716           Post Anesthesia Care and Evaluation    Patient location during evaluation: ICU  Patient participation: complete - patient cannot participate  Post-procedure mental status: sedated.  Pain score: 0  Pain management: adequate  Airway patency: intubated.  Anesthetic complications: No anesthetic complications  PONV Status: none  Cardiovascular status: acceptable  Respiratory status: acceptable, ETT and ventilator  Hydration status: acceptable  No anesthesia care post op

## 2021-03-24 NOTE — ANESTHESIA PROCEDURE NOTES
Preanesthesia Checklist:  Patient identified, IV assessed, risks and benefits discussed, monitors and equipment assessed, procedure being performed at surgeon's request and anesthesia consent obtained.    General Procedure Information  BREANNE Placed for monitoring purposes only -- This is not a diagnostic BREANNE  Diagnostic Indications for Echo:  hemodynamic monitoring  Physician Requesting Echo: Arjun Kee MD  Location performed:  OR  Intubated  Bite block not placed  Heart visualized  Probe Insertion:  Easy  Probe Type:  Multiplane  Modalities:  2D only, color flow mapping, continuous wave Doppler and pulse wave Doppler    Echocardiographic and Doppler Measurements    Ventricles    Right Ventricle:  Cavity size normal.  Hypertrophy not present.    Left Ventricle:  Global Function severely impaired.  Ejection Fraction 25%.      Ventricular Regional Function:  1- Basal Anteroseptal:  normal  2- Basal Anterior:  normal  3- Basal Anterolateral:  normal  4- Basal Inferolateral:  normal  5- Basal Inferior:  normal  6- Basal Inferoseptal:  normal  7- Mid Anteroseptal:  hypokinetic  8- Mid Anterior:  hypokinetic  9- Mid Anterolateral:  hypokinetic  10- Mid Inferolateral:  hypokinetic  11- Mid Inferior:  hypokinetic  12- Mid Inferoseptal:  hypokinetic  13- Apical Anterior:  akinetic  14- Apical Lateral:  hypokinetic  15- Apical Inferior:  hypokinetic  16- Apical Septal:  hypokinetic  17- [place]:  akinetic      Valves    Aortic Valve:  Annulus normal.  Stenosis not present.  Regurgitation absent.  Leaflet motions normal.      Mitral Valve:  Annulus calcified.  Stenosis not present.  Regurgitation mild.  Leaflet motions normal.      Tricuspid Valve:  Annulus normal.  Stenosis not present.  Regurgitation absent.  Leaflets normal.  Leaflet motions normal.    Pulmonic Valve:  Annulus normal.  Stenosis not present.  Regurgitation trace.          Aorta    Ascending Aorta:  Size normal.  Dissection not present.  Plaque thickness  less than 3 mm.  Mobile plaque not present.    Aortic Arch:  Size normal.  Dissection not present.  Plaque thickness less than 3 mm.  Mobile plaque not present.    Descending Aorta:  Size normal.  Dissection not present.  Plaque thickness less than 3 mm.  Mobile plaque not present.          Atria    Right Atrium:  Size normal.  Spontaneous echo contrast not present.  Thrombus not present.  Tumor not present.  Device not present.      Left Atrium:  Size normal.  Spontaneous echo contrast not present.  Thrombus not present.  Tumor not present.  Device not present.    Left atrial appendage normal.      Septa    Atrial Septum:  Intra-atrial septal morphology normal.      Ventricular Septum:  Intra-ventricular septum morphology normal.      Diastolic Function Measurements:  Diastolic Dysfunction Grade= indeterminate  E= 86 ms  A= 70 ms  E/A Ratio=  1.2  DT=  ms  S/D=  0.8  IVRT=        Anesthesia Information      Echocardiogram Comments:       Mild mr eccentric ant directed jet   No ai/tr  lvf depressed ef about 25% but patient on inotropes  Ant apical wall and apex akinetic

## 2021-03-24 NOTE — ANESTHESIA PROCEDURE NOTES
Airway  Urgency: elective    Date/Time: 3/24/2021 1:00 PM  Airway not difficult    General Information and Staff    Patient location during procedure: OR    Indications and Patient Condition  Indications for airway management: airway protection    Preoxygenated: yes  MILS not maintained throughout  Mask difficulty assessment: 1 - vent by mask    Final Airway Details  Final airway type: endotracheal airway      Successful airway: ETT  Cuffed: yes   Successful intubation technique: direct laryngoscopy  Endotracheal tube insertion site: oral  Blade: Stephanie  Blade size: 3  ETT size (mm): 8.0  Cormack-Lehane Classification: grade I - full view of glottis  Placement verified by: chest auscultation and capnometry   Measured from: lips  ETT/EBT  to lips (cm): 20  Number of attempts at approach: 1  Assessment: lips, teeth, and gum same as pre-op and atraumatic intubation    Additional Comments  Negative epigastric sounds, Breath sound equal bilaterally with symmetric chest rise and fall

## 2021-03-24 NOTE — ANESTHESIA PREPROCEDURE EVALUATION
Anesthesia Evaluation     Patient summary reviewed and Nursing notes reviewed   NPO Solid Status: > 8 hours  NPO Liquid Status: > 8 hours           Airway   Mallampati: II  TM distance: >3 FB  Neck ROM: full  No difficulty expected  Dental - normal exam     Pulmonary - normal exam     ROS comment: XR Chest 1 View 3/22/21  1. CHF with small bilateral effusions.   2. Right IJ line tip in the SVC with no pneumothorax  Cardiovascular - normal exam    ECG reviewed    (+) hypertension, CAD, CHF Systolic <55%, hyperlipidemia,     ROS comment: Cath OSH:  80% LAD stenosis.  A 90% right coronary artery stenosis and a 80% circumflex stenosis.  His ventricle appears to be around 25% ejection fraction.    Echo: 3/23/21  Interpretation Summary    · Left ventricular ejection fraction appears to be less than 20%. Left ventricular systolic function is severely decreased.  · Left ventricular diastolic function is consistent with (grade II w/high LAP) pseudonormalization.  · Left ventricular wall thickness is consistent with mild concentric hypertrophy.  · There is moderate calcification of the aortic valve mainly affecting the right coronary cusp(s).  · Left atrial volume is mildly increased.           Neuro/Psych  GI/Hepatic/Renal/Endo    (+)   diabetes mellitus poorly controlled,     Musculoskeletal         ROS comment: Bilateral BKAs  Osteomyelitis, right hand with finger amputation  Abdominal  - normal exam    Bowel sounds: normal.   Substance History      OB/GYN          Other                      Anesthesia Plan    ASA 4     general   (Lizette, PAC, BREANNE; post op ICU  High risk for prolonged intubation along with a complicated post operative course.)  intravenous induction   Postoperative Plan: Expected vent after surgery  Anesthetic plan, all risks, benefits, and alternatives have been provided, discussed and informed consent has been obtained with: patient.

## 2021-03-25 ENCOUNTER — APPOINTMENT (OUTPATIENT)
Dept: GENERAL RADIOLOGY | Facility: HOSPITAL | Age: 61
End: 2021-03-25

## 2021-03-25 LAB
ALBUMIN SERPL-MCNC: 3.3 G/DL (ref 3.5–5.2)
ANION GAP SERPL CALCULATED.3IONS-SCNC: 12 MMOL/L (ref 5–15)
BASOPHILS # BLD AUTO: 0.02 10*3/MM3 (ref 0–0.2)
BASOPHILS # BLD AUTO: 0.03 10*3/MM3 (ref 0–0.2)
BASOPHILS NFR BLD AUTO: 0.2 % (ref 0–1.5)
BASOPHILS NFR BLD AUTO: 0.2 % (ref 0–1.5)
BH BB BLOOD EXPIRATION DATE: NORMAL
BH BB BLOOD TYPE BARCODE: 7300
BH BB DISPENSE STATUS: NORMAL
BH BB PRODUCT CODE: NORMAL
BH BB UNIT NUMBER: NORMAL
BUN SERPL-MCNC: 36 MG/DL (ref 8–23)
BUN/CREAT SERPL: 21.3 (ref 7–25)
CALCIUM SPEC-SCNC: 8.7 MG/DL (ref 8.6–10.5)
CHLORIDE SERPL-SCNC: 108 MMOL/L (ref 98–107)
CO2 SERPL-SCNC: 23 MMOL/L (ref 22–29)
CREAT SERPL-MCNC: 1.69 MG/DL (ref 0.76–1.27)
CROSSMATCH INTERPRETATION: NORMAL
DEPRECATED RDW RBC AUTO: 50.5 FL (ref 37–54)
DEPRECATED RDW RBC AUTO: 51.1 FL (ref 37–54)
EOSINOPHIL # BLD AUTO: 0.05 10*3/MM3 (ref 0–0.4)
EOSINOPHIL # BLD AUTO: 0.06 10*3/MM3 (ref 0–0.4)
EOSINOPHIL NFR BLD AUTO: 0.3 % (ref 0.3–6.2)
EOSINOPHIL NFR BLD AUTO: 0.5 % (ref 0.3–6.2)
ERYTHROCYTE [DISTWIDTH] IN BLOOD BY AUTOMATED COUNT: 14.8 % (ref 12.3–15.4)
ERYTHROCYTE [DISTWIDTH] IN BLOOD BY AUTOMATED COUNT: 14.8 % (ref 12.3–15.4)
GFR SERPL CREATININE-BSD FRML MDRD: 41 ML/MIN/1.73
GLUCOSE BLDC GLUCOMTR-MCNC: 105 MG/DL (ref 70–130)
GLUCOSE BLDC GLUCOMTR-MCNC: 110 MG/DL (ref 70–130)
GLUCOSE BLDC GLUCOMTR-MCNC: 118 MG/DL (ref 70–130)
GLUCOSE BLDC GLUCOMTR-MCNC: 131 MG/DL (ref 70–130)
GLUCOSE BLDC GLUCOMTR-MCNC: 139 MG/DL (ref 70–130)
GLUCOSE BLDC GLUCOMTR-MCNC: 156 MG/DL (ref 70–130)
GLUCOSE BLDC GLUCOMTR-MCNC: 157 MG/DL (ref 70–130)
GLUCOSE BLDC GLUCOMTR-MCNC: 169 MG/DL (ref 70–130)
GLUCOSE BLDC GLUCOMTR-MCNC: 73 MG/DL (ref 70–130)
GLUCOSE BLDC GLUCOMTR-MCNC: 74 MG/DL (ref 70–130)
GLUCOSE BLDC GLUCOMTR-MCNC: 79 MG/DL (ref 70–130)
GLUCOSE BLDC GLUCOMTR-MCNC: 93 MG/DL (ref 70–130)
GLUCOSE BLDC GLUCOMTR-MCNC: 96 MG/DL (ref 70–130)
GLUCOSE BLDC GLUCOMTR-MCNC: 96 MG/DL (ref 70–130)
GLUCOSE BLDC GLUCOMTR-MCNC: 99 MG/DL (ref 70–130)
GLUCOSE BLDC GLUCOMTR-MCNC: 99 MG/DL (ref 70–130)
GLUCOSE SERPL-MCNC: 89 MG/DL (ref 65–99)
HCT VFR BLD AUTO: 32.3 % (ref 37.5–51)
HCT VFR BLD AUTO: 32.9 % (ref 37.5–51)
HGB BLD-MCNC: 10.3 G/DL (ref 13–17.7)
HGB BLD-MCNC: 10.7 G/DL (ref 13–17.7)
IMM GRANULOCYTES # BLD AUTO: 0.06 10*3/MM3 (ref 0–0.05)
IMM GRANULOCYTES # BLD AUTO: 0.11 10*3/MM3 (ref 0–0.05)
IMM GRANULOCYTES NFR BLD AUTO: 0.5 % (ref 0–0.5)
IMM GRANULOCYTES NFR BLD AUTO: 0.7 % (ref 0–0.5)
INR PPP: 1.4 (ref 0.85–1.16)
LYMPHOCYTES # BLD AUTO: 0.57 10*3/MM3 (ref 0.7–3.1)
LYMPHOCYTES # BLD AUTO: 0.72 10*3/MM3 (ref 0.7–3.1)
LYMPHOCYTES NFR BLD AUTO: 3.6 % (ref 19.6–45.3)
LYMPHOCYTES NFR BLD AUTO: 6.3 % (ref 19.6–45.3)
MAGNESIUM SERPL-MCNC: 2.4 MG/DL (ref 1.6–2.4)
MCH RBC QN AUTO: 30.1 PG (ref 26.6–33)
MCH RBC QN AUTO: 30.3 PG (ref 26.6–33)
MCHC RBC AUTO-ENTMCNC: 31.9 G/DL (ref 31.5–35.7)
MCHC RBC AUTO-ENTMCNC: 32.5 G/DL (ref 31.5–35.7)
MCV RBC AUTO: 93.2 FL (ref 79–97)
MCV RBC AUTO: 94.4 FL (ref 79–97)
MONOCYTES # BLD AUTO: 1.1 10*3/MM3 (ref 0.1–0.9)
MONOCYTES # BLD AUTO: 1.14 10*3/MM3 (ref 0.1–0.9)
MONOCYTES NFR BLD AUTO: 7.2 % (ref 5–12)
MONOCYTES NFR BLD AUTO: 9.6 % (ref 5–12)
NEUTROPHILS NFR BLD AUTO: 13.84 10*3/MM3 (ref 1.7–7)
NEUTROPHILS NFR BLD AUTO: 82.9 % (ref 42.7–76)
NEUTROPHILS NFR BLD AUTO: 88 % (ref 42.7–76)
NEUTROPHILS NFR BLD AUTO: 9.5 10*3/MM3 (ref 1.7–7)
NRBC BLD AUTO-RTO: 0 /100 WBC (ref 0–0.2)
NRBC BLD AUTO-RTO: 0 /100 WBC (ref 0–0.2)
PHOSPHATE SERPL-MCNC: 3.8 MG/DL (ref 2.5–4.5)
PLATELET # BLD AUTO: 128 10*3/MM3 (ref 140–450)
PLATELET # BLD AUTO: 165 10*3/MM3 (ref 140–450)
PMV BLD AUTO: 11.7 FL (ref 6–12)
PMV BLD AUTO: 11.8 FL (ref 6–12)
POTASSIUM SERPL-SCNC: 4 MMOL/L (ref 3.5–5.2)
PROTHROMBIN TIME: 16.9 SECONDS (ref 11.5–14)
QT INTERVAL: 398 MS
QT INTERVAL: 480 MS
QTC INTERVAL: 456 MS
QTC INTERVAL: 547 MS
RBC # BLD AUTO: 3.42 10*6/MM3 (ref 4.14–5.8)
RBC # BLD AUTO: 3.53 10*6/MM3 (ref 4.14–5.8)
SODIUM SERPL-SCNC: 143 MMOL/L (ref 136–145)
UNIT  ABO: NORMAL
UNIT  RH: NORMAL
WBC # BLD AUTO: 11.46 10*3/MM3 (ref 3.4–10.8)
WBC # BLD AUTO: 15.74 10*3/MM3 (ref 3.4–10.8)

## 2021-03-25 PROCEDURE — P9041 ALBUMIN (HUMAN),5%, 50ML: HCPCS | Performed by: PHYSICIAN ASSISTANT

## 2021-03-25 PROCEDURE — 82962 GLUCOSE BLOOD TEST: CPT

## 2021-03-25 PROCEDURE — 85025 COMPLETE CBC W/AUTO DIFF WBC: CPT | Performed by: PHYSICIAN ASSISTANT

## 2021-03-25 PROCEDURE — 99232 SBSQ HOSP IP/OBS MODERATE 35: CPT | Performed by: INTERNAL MEDICINE

## 2021-03-25 PROCEDURE — 71045 X-RAY EXAM CHEST 1 VIEW: CPT

## 2021-03-25 PROCEDURE — 25010000002 PIPERACILLIN SOD-TAZOBACTAM PER 1 G: Performed by: INTERNAL MEDICINE

## 2021-03-25 PROCEDURE — 25010000003 AMPICILLIN-SULBACTAM PER 1.5 G: Performed by: PHYSICIAN ASSISTANT

## 2021-03-25 PROCEDURE — 85610 PROTHROMBIN TIME: CPT | Performed by: PHYSICIAN ASSISTANT

## 2021-03-25 PROCEDURE — 25010000002 ALBUMIN HUMAN 5% PER 50 ML: Performed by: PHYSICIAN ASSISTANT

## 2021-03-25 PROCEDURE — 25010000002 DAPTOMYCIN PER 1 MG: Performed by: PHYSICIAN ASSISTANT

## 2021-03-25 PROCEDURE — 94799 UNLISTED PULMONARY SVC/PX: CPT

## 2021-03-25 PROCEDURE — 93010 ELECTROCARDIOGRAM REPORT: CPT | Performed by: INTERNAL MEDICINE

## 2021-03-25 PROCEDURE — 94660 CPAP INITIATION&MGMT: CPT

## 2021-03-25 PROCEDURE — 80069 RENAL FUNCTION PANEL: CPT | Performed by: PHYSICIAN ASSISTANT

## 2021-03-25 PROCEDURE — 25010000003 CEFUROXIME SODIUM 1.5 G RECONSTITUTED SOLUTION: Performed by: PHYSICIAN ASSISTANT

## 2021-03-25 PROCEDURE — 93005 ELECTROCARDIOGRAM TRACING: CPT | Performed by: PHYSICIAN ASSISTANT

## 2021-03-25 PROCEDURE — 83735 ASSAY OF MAGNESIUM: CPT | Performed by: PHYSICIAN ASSISTANT

## 2021-03-25 PROCEDURE — 63710000001 INSULIN DETEMIR PER 5 UNITS: Performed by: INTERNAL MEDICINE

## 2021-03-25 RX ORDER — NICOTINE POLACRILEX 4 MG
15 LOZENGE BUCCAL
Status: DISCONTINUED | OUTPATIENT
Start: 2021-03-25 | End: 2021-04-01 | Stop reason: HOSPADM

## 2021-03-25 RX ORDER — DEXTROSE MONOHYDRATE 25 G/50ML
25 INJECTION, SOLUTION INTRAVENOUS
Status: DISCONTINUED | OUTPATIENT
Start: 2021-03-25 | End: 2021-04-01 | Stop reason: HOSPADM

## 2021-03-25 RX ORDER — MORPHINE SULFATE 2 MG/ML
2 INJECTION, SOLUTION INTRAMUSCULAR; INTRAVENOUS
Status: DISCONTINUED | OUTPATIENT
Start: 2021-03-25 | End: 2021-04-01 | Stop reason: HOSPADM

## 2021-03-25 RX ADMIN — ALBUMIN HUMAN 500 ML: 0.05 INJECTION, SOLUTION INTRAVENOUS at 04:38

## 2021-03-25 RX ADMIN — METOPROLOL TARTRATE 12.5 MG: 25 TABLET, FILM COATED ORAL at 20:31

## 2021-03-25 RX ADMIN — ATORVASTATIN CALCIUM 40 MG: 40 TABLET, FILM COATED ORAL at 20:31

## 2021-03-25 RX ADMIN — TAZOBACTAM SODIUM AND PIPERACILLIN SODIUM 4.5 G: 500; 4 INJECTION, SOLUTION INTRAVENOUS at 10:29

## 2021-03-25 RX ADMIN — CASTOR OIL AND BALSAM, PERU: 788; 87 OINTMENT TOPICAL at 08:38

## 2021-03-25 RX ADMIN — CEFUROXIME SODIUM 1.5 G: 1.5 INJECTION, POWDER, FOR SOLUTION INTRAVENOUS at 04:09

## 2021-03-25 RX ADMIN — TAZOBACTAM SODIUM AND PIPERACILLIN SODIUM 4.5 G: 500; 4 INJECTION, SOLUTION INTRAVENOUS at 17:10

## 2021-03-25 RX ADMIN — CEFUROXIME SODIUM 1.5 G: 1.5 INJECTION, POWDER, FOR SOLUTION INTRAVENOUS at 14:28

## 2021-03-25 RX ADMIN — SODIUM CHLORIDE 3 G: 900 INJECTION INTRAVENOUS at 00:07

## 2021-03-25 RX ADMIN — TAZOBACTAM SODIUM AND PIPERACILLIN SODIUM 4.5 G: 500; 4 INJECTION, SOLUTION INTRAVENOUS at 22:25

## 2021-03-25 RX ADMIN — SODIUM CHLORIDE 500 ML: 9 INJECTION, SOLUTION INTRAVENOUS at 10:30

## 2021-03-25 RX ADMIN — OXYCODONE HYDROCHLORIDE AND ACETAMINOPHEN 2 TABLET: 5; 325 TABLET ORAL at 05:28

## 2021-03-25 RX ADMIN — SODIUM CHLORIDE 3 G: 900 INJECTION INTRAVENOUS at 05:28

## 2021-03-25 RX ADMIN — PANTOPRAZOLE SODIUM 40 MG: 40 INJECTION, POWDER, FOR SOLUTION INTRAVENOUS at 05:28

## 2021-03-25 RX ADMIN — METOPROLOL TARTRATE 2.5 MG: 5 INJECTION INTRAVENOUS at 06:50

## 2021-03-25 RX ADMIN — CEFUROXIME SODIUM 1.5 G: 1.5 INJECTION, POWDER, FOR SOLUTION INTRAVENOUS at 20:31

## 2021-03-25 RX ADMIN — INSULIN DETEMIR 20 UNITS: 100 INJECTION, SOLUTION SUBCUTANEOUS at 17:07

## 2021-03-25 RX ADMIN — ASPIRIN 325 MG: 325 TABLET, COATED ORAL at 08:38

## 2021-03-25 RX ADMIN — OXYCODONE HYDROCHLORIDE AND ACETAMINOPHEN 2 TABLET: 5; 325 TABLET ORAL at 11:01

## 2021-03-25 RX ADMIN — DAPTOMYCIN 550 MG: 500 INJECTION, POWDER, LYOPHILIZED, FOR SOLUTION INTRAVENOUS at 17:18

## 2021-03-25 RX ADMIN — HYDROCODONE BITARTRATE AND ACETAMINOPHEN 1 TABLET: 7.5; 325 TABLET ORAL at 18:18

## 2021-03-25 RX ADMIN — PAROXETINE HYDROCHLORIDE 40 MG: 20 TABLET, FILM COATED ORAL at 08:38

## 2021-03-26 ENCOUNTER — APPOINTMENT (OUTPATIENT)
Dept: GENERAL RADIOLOGY | Facility: HOSPITAL | Age: 61
End: 2021-03-26

## 2021-03-26 LAB
ANION GAP SERPL CALCULATED.3IONS-SCNC: 13 MMOL/L (ref 5–15)
BUN SERPL-MCNC: 34 MG/DL (ref 8–23)
BUN/CREAT SERPL: 16.9 (ref 7–25)
CALCIUM SPEC-SCNC: 7.9 MG/DL (ref 8.6–10.5)
CHLORIDE SERPL-SCNC: 104 MMOL/L (ref 98–107)
CO2 SERPL-SCNC: 22 MMOL/L (ref 22–29)
CREAT SERPL-MCNC: 2.01 MG/DL (ref 0.76–1.27)
DEPRECATED RDW RBC AUTO: 51.2 FL (ref 37–54)
ERYTHROCYTE [DISTWIDTH] IN BLOOD BY AUTOMATED COUNT: 14.9 % (ref 12.3–15.4)
GFR SERPL CREATININE-BSD FRML MDRD: 34 ML/MIN/1.73
GLUCOSE BLDC GLUCOMTR-MCNC: 110 MG/DL (ref 70–130)
GLUCOSE BLDC GLUCOMTR-MCNC: 152 MG/DL (ref 70–130)
GLUCOSE BLDC GLUCOMTR-MCNC: 191 MG/DL (ref 70–130)
GLUCOSE BLDC GLUCOMTR-MCNC: 77 MG/DL (ref 70–130)
GLUCOSE SERPL-MCNC: 77 MG/DL (ref 65–99)
HCT VFR BLD AUTO: 31.8 % (ref 37.5–51)
HGB BLD-MCNC: 10.1 G/DL (ref 13–17.7)
MCH RBC QN AUTO: 29.7 PG (ref 26.6–33)
MCHC RBC AUTO-ENTMCNC: 31.8 G/DL (ref 31.5–35.7)
MCV RBC AUTO: 93.5 FL (ref 79–97)
PLATELET # BLD AUTO: 135 10*3/MM3 (ref 140–450)
PMV BLD AUTO: 11.8 FL (ref 6–12)
POTASSIUM SERPL-SCNC: 3.7 MMOL/L (ref 3.5–5.2)
QT INTERVAL: 402 MS
QTC INTERVAL: 463 MS
RBC # BLD AUTO: 3.4 10*6/MM3 (ref 4.14–5.8)
SODIUM SERPL-SCNC: 139 MMOL/L (ref 136–145)
WBC # BLD AUTO: 12.59 10*3/MM3 (ref 3.4–10.8)

## 2021-03-26 PROCEDURE — 97166 OT EVAL MOD COMPLEX 45 MIN: CPT | Performed by: OCCUPATIONAL THERAPIST

## 2021-03-26 PROCEDURE — 93010 ELECTROCARDIOGRAM REPORT: CPT | Performed by: INTERNAL MEDICINE

## 2021-03-26 PROCEDURE — 25010000002 AMIODARONE IN DEXTROSE 5% 360-4.14 MG/200ML-% SOLUTION: Performed by: NURSE PRACTITIONER

## 2021-03-26 PROCEDURE — G0108 DIAB MANAGE TRN  PER INDIV: HCPCS

## 2021-03-26 PROCEDURE — 25010000002 PIPERACILLIN SOD-TAZOBACTAM PER 1 G: Performed by: INTERNAL MEDICINE

## 2021-03-26 PROCEDURE — 25010000002 AMIODARONE IN DEXTROSE 5% 150-4.21 MG/100ML-% SOLUTION: Performed by: NURSE PRACTITIONER

## 2021-03-26 PROCEDURE — 85027 COMPLETE CBC AUTOMATED: CPT | Performed by: PHYSICIAN ASSISTANT

## 2021-03-26 PROCEDURE — 99232 SBSQ HOSP IP/OBS MODERATE 35: CPT | Performed by: INTERNAL MEDICINE

## 2021-03-26 PROCEDURE — 71045 X-RAY EXAM CHEST 1 VIEW: CPT

## 2021-03-26 PROCEDURE — 63710000001 INSULIN DETEMIR PER 5 UNITS: Performed by: INTERNAL MEDICINE

## 2021-03-26 PROCEDURE — 87040 BLOOD CULTURE FOR BACTERIA: CPT | Performed by: INTERNAL MEDICINE

## 2021-03-26 PROCEDURE — 80048 BASIC METABOLIC PNL TOTAL CA: CPT | Performed by: PHYSICIAN ASSISTANT

## 2021-03-26 PROCEDURE — 93005 ELECTROCARDIOGRAM TRACING: CPT | Performed by: PHYSICIAN ASSISTANT

## 2021-03-26 PROCEDURE — 25010000003 CEFUROXIME SODIUM 1.5 G RECONSTITUTED SOLUTION: Performed by: PHYSICIAN ASSISTANT

## 2021-03-26 PROCEDURE — 94799 UNLISTED PULMONARY SVC/PX: CPT

## 2021-03-26 PROCEDURE — 93005 ELECTROCARDIOGRAM TRACING: CPT | Performed by: INTERNAL MEDICINE

## 2021-03-26 PROCEDURE — 82962 GLUCOSE BLOOD TEST: CPT

## 2021-03-26 PROCEDURE — 63710000001 INSULIN LISPRO (HUMAN) PER 5 UNITS: Performed by: INTERNAL MEDICINE

## 2021-03-26 PROCEDURE — 25010000002 DAPTOMYCIN PER 1 MG: Performed by: PHYSICIAN ASSISTANT

## 2021-03-26 RX ORDER — VENLAFAXINE 37.5 MG/1
75 TABLET ORAL
Status: DISCONTINUED | OUTPATIENT
Start: 2021-03-26 | End: 2021-04-01 | Stop reason: HOSPADM

## 2021-03-26 RX ADMIN — AMIODARONE HYDROCHLORIDE 0.5 MG/MIN: 1.8 INJECTION, SOLUTION INTRAVENOUS at 20:50

## 2021-03-26 RX ADMIN — METOPROLOL TARTRATE 12.5 MG: 25 TABLET, FILM COATED ORAL at 20:41

## 2021-03-26 RX ADMIN — CEFUROXIME SODIUM 1.5 G: 1.5 INJECTION, POWDER, FOR SOLUTION INTRAVENOUS at 04:29

## 2021-03-26 RX ADMIN — AMIODARONE HYDROCHLORIDE 150 MG: 1.5 INJECTION, SOLUTION INTRAVENOUS at 19:51

## 2021-03-26 RX ADMIN — CASTOR OIL AND BALSAM, PERU: 788; 87 OINTMENT TOPICAL at 22:05

## 2021-03-26 RX ADMIN — TAZOBACTAM SODIUM AND PIPERACILLIN SODIUM 4.5 G: 500; 4 INJECTION, SOLUTION INTRAVENOUS at 22:05

## 2021-03-26 RX ADMIN — INSULIN DETEMIR 20 UNITS: 100 INJECTION, SOLUTION SUBCUTANEOUS at 20:49

## 2021-03-26 RX ADMIN — OXYCODONE HYDROCHLORIDE AND ACETAMINOPHEN 2 TABLET: 5; 325 TABLET ORAL at 09:41

## 2021-03-26 RX ADMIN — TAZOBACTAM SODIUM AND PIPERACILLIN SODIUM 4.5 G: 500; 4 INJECTION, SOLUTION INTRAVENOUS at 15:17

## 2021-03-26 RX ADMIN — VENLAFAXINE 75 MG: 37.5 TABLET ORAL at 09:41

## 2021-03-26 RX ADMIN — HYDROCODONE BITARTRATE AND ACETAMINOPHEN 1 TABLET: 7.5; 325 TABLET ORAL at 20:41

## 2021-03-26 RX ADMIN — ATORVASTATIN CALCIUM 40 MG: 40 TABLET, FILM COATED ORAL at 20:41

## 2021-03-26 RX ADMIN — METOPROLOL TARTRATE 12.5 MG: 25 TABLET, FILM COATED ORAL at 08:49

## 2021-03-26 RX ADMIN — HYDROCODONE BITARTRATE AND ACETAMINOPHEN 1 TABLET: 7.5; 325 TABLET ORAL at 01:03

## 2021-03-26 RX ADMIN — INSULIN LISPRO 4 UNITS: 100 INJECTION, SOLUTION INTRAVENOUS; SUBCUTANEOUS at 20:49

## 2021-03-26 RX ADMIN — ASPIRIN 325 MG: 325 TABLET, COATED ORAL at 08:49

## 2021-03-26 RX ADMIN — PAROXETINE HYDROCHLORIDE 40 MG: 20 TABLET, FILM COATED ORAL at 08:49

## 2021-03-26 RX ADMIN — CASTOR OIL AND BALSAM, PERU: 788; 87 OINTMENT TOPICAL at 05:16

## 2021-03-26 RX ADMIN — INSULIN LISPRO 4 UNITS: 100 INJECTION, SOLUTION INTRAVENOUS; SUBCUTANEOUS at 17:48

## 2021-03-26 RX ADMIN — DAPTOMYCIN 550 MG: 500 INJECTION, POWDER, LYOPHILIZED, FOR SOLUTION INTRAVENOUS at 17:48

## 2021-03-26 RX ADMIN — TAZOBACTAM SODIUM AND PIPERACILLIN SODIUM 4.5 G: 500; 4 INJECTION, SOLUTION INTRAVENOUS at 06:18

## 2021-03-26 RX ADMIN — PANTOPRAZOLE SODIUM 40 MG: 40 INJECTION, POWDER, FOR SOLUTION INTRAVENOUS at 06:18

## 2021-03-27 LAB
ANION GAP SERPL CALCULATED.3IONS-SCNC: 11 MMOL/L (ref 5–15)
BUN SERPL-MCNC: 37 MG/DL (ref 8–23)
BUN/CREAT SERPL: 17.3 (ref 7–25)
CALCIUM SPEC-SCNC: 8 MG/DL (ref 8.6–10.5)
CHLORIDE SERPL-SCNC: 103 MMOL/L (ref 98–107)
CO2 SERPL-SCNC: 24 MMOL/L (ref 22–29)
CREAT SERPL-MCNC: 2.14 MG/DL (ref 0.76–1.27)
DEPRECATED RDW RBC AUTO: 50.4 FL (ref 37–54)
ERYTHROCYTE [DISTWIDTH] IN BLOOD BY AUTOMATED COUNT: 14.8 % (ref 12.3–15.4)
GFR SERPL CREATININE-BSD FRML MDRD: 31 ML/MIN/1.73
GLUCOSE BLDC GLUCOMTR-MCNC: 127 MG/DL (ref 70–130)
GLUCOSE BLDC GLUCOMTR-MCNC: 190 MG/DL (ref 70–130)
GLUCOSE BLDC GLUCOMTR-MCNC: 233 MG/DL (ref 70–130)
GLUCOSE BLDC GLUCOMTR-MCNC: 98 MG/DL (ref 70–130)
GLUCOSE SERPL-MCNC: 102 MG/DL (ref 65–99)
HCT VFR BLD AUTO: 32.4 % (ref 37.5–51)
HGB BLD-MCNC: 10.5 G/DL (ref 13–17.7)
MCH RBC QN AUTO: 30.1 PG (ref 26.6–33)
MCHC RBC AUTO-ENTMCNC: 32.4 G/DL (ref 31.5–35.7)
MCV RBC AUTO: 92.8 FL (ref 79–97)
PLATELET # BLD AUTO: 160 10*3/MM3 (ref 140–450)
PMV BLD AUTO: 12.2 FL (ref 6–12)
POTASSIUM SERPL-SCNC: 3.3 MMOL/L (ref 3.5–5.2)
RBC # BLD AUTO: 3.49 10*6/MM3 (ref 4.14–5.8)
SODIUM SERPL-SCNC: 138 MMOL/L (ref 136–145)
WBC # BLD AUTO: 14.77 10*3/MM3 (ref 3.4–10.8)

## 2021-03-27 PROCEDURE — 99232 SBSQ HOSP IP/OBS MODERATE 35: CPT | Performed by: INTERNAL MEDICINE

## 2021-03-27 PROCEDURE — 80048 BASIC METABOLIC PNL TOTAL CA: CPT | Performed by: PHYSICIAN ASSISTANT

## 2021-03-27 PROCEDURE — 63710000001 INSULIN DETEMIR PER 5 UNITS: Performed by: INTERNAL MEDICINE

## 2021-03-27 PROCEDURE — 97161 PT EVAL LOW COMPLEX 20 MIN: CPT

## 2021-03-27 PROCEDURE — 25010000002 PIPERACILLIN SOD-TAZOBACTAM PER 1 G: Performed by: INTERNAL MEDICINE

## 2021-03-27 PROCEDURE — 93005 ELECTROCARDIOGRAM TRACING: CPT | Performed by: INTERNAL MEDICINE

## 2021-03-27 PROCEDURE — 25010000002 AMIODARONE IN DEXTROSE 5% 360-4.14 MG/200ML-% SOLUTION: Performed by: NURSE PRACTITIONER

## 2021-03-27 PROCEDURE — 85027 COMPLETE CBC AUTOMATED: CPT | Performed by: PHYSICIAN ASSISTANT

## 2021-03-27 PROCEDURE — 94799 UNLISTED PULMONARY SVC/PX: CPT

## 2021-03-27 PROCEDURE — 25010000002 DAPTOMYCIN PER 1 MG: Performed by: PHYSICIAN ASSISTANT

## 2021-03-27 PROCEDURE — 82962 GLUCOSE BLOOD TEST: CPT

## 2021-03-27 PROCEDURE — 97530 THERAPEUTIC ACTIVITIES: CPT

## 2021-03-27 PROCEDURE — 93010 ELECTROCARDIOGRAM REPORT: CPT | Performed by: INTERNAL MEDICINE

## 2021-03-27 PROCEDURE — 63710000001 INSULIN LISPRO (HUMAN) PER 5 UNITS: Performed by: INTERNAL MEDICINE

## 2021-03-27 RX ORDER — AMIODARONE HYDROCHLORIDE 200 MG/1
200 TABLET ORAL
Status: DISCONTINUED | OUTPATIENT
Start: 2021-03-27 | End: 2021-04-01 | Stop reason: HOSPADM

## 2021-03-27 RX ADMIN — ATORVASTATIN CALCIUM 40 MG: 40 TABLET, FILM COATED ORAL at 20:31

## 2021-03-27 RX ADMIN — METOPROLOL TARTRATE 12.5 MG: 25 TABLET, FILM COATED ORAL at 20:31

## 2021-03-27 RX ADMIN — TAZOBACTAM SODIUM AND PIPERACILLIN SODIUM 4.5 G: 500; 4 INJECTION, SOLUTION INTRAVENOUS at 23:14

## 2021-03-27 RX ADMIN — CASTOR OIL AND BALSAM, PERU: 788; 87 OINTMENT TOPICAL at 21:51

## 2021-03-27 RX ADMIN — HYDROCODONE BITARTRATE AND ACETAMINOPHEN 1 TABLET: 7.5; 325 TABLET ORAL at 23:14

## 2021-03-27 RX ADMIN — INSULIN LISPRO 4 UNITS: 100 INJECTION, SOLUTION INTRAVENOUS; SUBCUTANEOUS at 17:11

## 2021-03-27 RX ADMIN — METOPROLOL TARTRATE 12.5 MG: 25 TABLET, FILM COATED ORAL at 08:26

## 2021-03-27 RX ADMIN — POTASSIUM CHLORIDE 40 MEQ: 750 CAPSULE, EXTENDED RELEASE ORAL at 08:26

## 2021-03-27 RX ADMIN — PAROXETINE HYDROCHLORIDE 40 MG: 20 TABLET, FILM COATED ORAL at 08:26

## 2021-03-27 RX ADMIN — HYDROCODONE BITARTRATE AND ACETAMINOPHEN 1 TABLET: 7.5; 325 TABLET ORAL at 00:14

## 2021-03-27 RX ADMIN — DAPTOMYCIN 550 MG: 500 INJECTION, POWDER, LYOPHILIZED, FOR SOLUTION INTRAVENOUS at 18:33

## 2021-03-27 RX ADMIN — AMIODARONE HYDROCHLORIDE 200 MG: 200 TABLET ORAL at 17:12

## 2021-03-27 RX ADMIN — HYDROCODONE BITARTRATE AND ACETAMINOPHEN 1 TABLET: 7.5; 325 TABLET ORAL at 08:26

## 2021-03-27 RX ADMIN — CASTOR OIL AND BALSAM, PERU: 788; 87 OINTMENT TOPICAL at 12:33

## 2021-03-27 RX ADMIN — AMIODARONE HYDROCHLORIDE 0.5 MG/MIN: 1.8 INJECTION, SOLUTION INTRAVENOUS at 08:25

## 2021-03-27 RX ADMIN — INSULIN DETEMIR 20 UNITS: 100 INJECTION, SOLUTION SUBCUTANEOUS at 20:49

## 2021-03-27 RX ADMIN — SODIUM CHLORIDE, PRESERVATIVE FREE 10 ML: 5 INJECTION INTRAVENOUS at 08:28

## 2021-03-27 RX ADMIN — ASPIRIN 325 MG: 325 TABLET, COATED ORAL at 08:26

## 2021-03-27 RX ADMIN — POTASSIUM CHLORIDE 40 MEQ: 750 CAPSULE, EXTENDED RELEASE ORAL at 05:18

## 2021-03-27 RX ADMIN — INSULIN LISPRO 8 UNITS: 100 INJECTION, SOLUTION INTRAVENOUS; SUBCUTANEOUS at 20:49

## 2021-03-27 RX ADMIN — TAZOBACTAM SODIUM AND PIPERACILLIN SODIUM 4.5 G: 500; 4 INJECTION, SOLUTION INTRAVENOUS at 15:10

## 2021-03-27 RX ADMIN — PANTOPRAZOLE SODIUM 40 MG: 40 INJECTION, POWDER, FOR SOLUTION INTRAVENOUS at 05:59

## 2021-03-27 RX ADMIN — VENLAFAXINE 75 MG: 37.5 TABLET ORAL at 08:26

## 2021-03-27 RX ADMIN — AMIODARONE HYDROCHLORIDE 200 MG: 200 TABLET ORAL at 12:34

## 2021-03-27 RX ADMIN — TAZOBACTAM SODIUM AND PIPERACILLIN SODIUM 4.5 G: 500; 4 INJECTION, SOLUTION INTRAVENOUS at 06:00

## 2021-03-28 PROBLEM — I25.5 ISCHEMIC CARDIOMYOPATHY: Status: ACTIVE | Noted: 2021-03-28

## 2021-03-28 PROBLEM — Z95.1 S/P CABG X 3: Status: ACTIVE | Noted: 2021-03-28

## 2021-03-28 PROBLEM — M86.9 OSTEOMYELITIS (HCC): Status: ACTIVE | Noted: 2021-03-28

## 2021-03-28 PROBLEM — S68.119A AMPUTATION FINGER: Status: ACTIVE | Noted: 2021-03-28

## 2021-03-28 PROBLEM — I73.9 PVD (PERIPHERAL VASCULAR DISEASE) (HCC): Status: ACTIVE | Noted: 2021-03-28

## 2021-03-28 LAB
ANION GAP SERPL CALCULATED.3IONS-SCNC: 12 MMOL/L (ref 5–15)
BASOPHILS # BLD AUTO: 0.04 10*3/MM3 (ref 0–0.2)
BASOPHILS # BLD AUTO: 0.04 10*3/MM3 (ref 0–0.2)
BASOPHILS NFR BLD AUTO: 0.3 % (ref 0–1.5)
BASOPHILS NFR BLD AUTO: 0.3 % (ref 0–1.5)
BUN SERPL-MCNC: 35 MG/DL (ref 8–23)
BUN/CREAT SERPL: 17.8 (ref 7–25)
CALCIUM SPEC-SCNC: 8 MG/DL (ref 8.6–10.5)
CHLORIDE SERPL-SCNC: 106 MMOL/L (ref 98–107)
CO2 SERPL-SCNC: 22 MMOL/L (ref 22–29)
CREAT SERPL-MCNC: 1.97 MG/DL (ref 0.76–1.27)
DEPRECATED RDW RBC AUTO: 51.1 FL (ref 37–54)
DEPRECATED RDW RBC AUTO: 51.2 FL (ref 37–54)
EOSINOPHIL # BLD AUTO: 0.64 10*3/MM3 (ref 0–0.4)
EOSINOPHIL # BLD AUTO: 0.67 10*3/MM3 (ref 0–0.4)
EOSINOPHIL NFR BLD AUTO: 5 % (ref 0.3–6.2)
EOSINOPHIL NFR BLD AUTO: 5.5 % (ref 0.3–6.2)
ERYTHROCYTE [DISTWIDTH] IN BLOOD BY AUTOMATED COUNT: 14.6 % (ref 12.3–15.4)
ERYTHROCYTE [DISTWIDTH] IN BLOOD BY AUTOMATED COUNT: 14.9 % (ref 12.3–15.4)
GFR SERPL CREATININE-BSD FRML MDRD: 35 ML/MIN/1.73
GLUCOSE BLDC GLUCOMTR-MCNC: 125 MG/DL (ref 70–130)
GLUCOSE BLDC GLUCOMTR-MCNC: 172 MG/DL (ref 70–130)
GLUCOSE BLDC GLUCOMTR-MCNC: 174 MG/DL (ref 70–130)
GLUCOSE BLDC GLUCOMTR-MCNC: 226 MG/DL (ref 70–130)
GLUCOSE SERPL-MCNC: 146 MG/DL (ref 65–99)
HCT VFR BLD AUTO: 32.7 % (ref 37.5–51)
HCT VFR BLD AUTO: 34.9 % (ref 37.5–51)
HGB BLD-MCNC: 10.5 G/DL (ref 13–17.7)
HGB BLD-MCNC: 11.1 G/DL (ref 13–17.7)
IMM GRANULOCYTES # BLD AUTO: 0.08 10*3/MM3 (ref 0–0.05)
IMM GRANULOCYTES # BLD AUTO: 0.09 10*3/MM3 (ref 0–0.05)
IMM GRANULOCYTES NFR BLD AUTO: 0.6 % (ref 0–0.5)
IMM GRANULOCYTES NFR BLD AUTO: 0.7 % (ref 0–0.5)
LYMPHOCYTES # BLD AUTO: 1.29 10*3/MM3 (ref 0.7–3.1)
LYMPHOCYTES # BLD AUTO: 1.34 10*3/MM3 (ref 0.7–3.1)
LYMPHOCYTES NFR BLD AUTO: 10.4 % (ref 19.6–45.3)
LYMPHOCYTES NFR BLD AUTO: 10.5 % (ref 19.6–45.3)
MCH RBC QN AUTO: 29.5 PG (ref 26.6–33)
MCH RBC QN AUTO: 30.3 PG (ref 26.6–33)
MCHC RBC AUTO-ENTMCNC: 31.8 G/DL (ref 31.5–35.7)
MCHC RBC AUTO-ENTMCNC: 32.1 G/DL (ref 31.5–35.7)
MCV RBC AUTO: 91.9 FL (ref 79–97)
MCV RBC AUTO: 95.4 FL (ref 79–97)
MONOCYTES # BLD AUTO: 1.43 10*3/MM3 (ref 0.1–0.9)
MONOCYTES # BLD AUTO: 1.45 10*3/MM3 (ref 0.1–0.9)
MONOCYTES NFR BLD AUTO: 11.2 % (ref 5–12)
MONOCYTES NFR BLD AUTO: 11.7 % (ref 5–12)
NEUTROPHILS NFR BLD AUTO: 71.3 % (ref 42.7–76)
NEUTROPHILS NFR BLD AUTO: 72.5 % (ref 42.7–76)
NEUTROPHILS NFR BLD AUTO: 8.74 10*3/MM3 (ref 1.7–7)
NEUTROPHILS NFR BLD AUTO: 9.36 10*3/MM3 (ref 1.7–7)
NRBC BLD AUTO-RTO: 0 /100 WBC (ref 0–0.2)
NRBC BLD AUTO-RTO: 0 /100 WBC (ref 0–0.2)
PLATELET # BLD AUTO: 204 10*3/MM3 (ref 140–450)
PLATELET # BLD AUTO: 214 10*3/MM3 (ref 140–450)
PMV BLD AUTO: 11.4 FL (ref 6–12)
PMV BLD AUTO: 12 FL (ref 6–12)
POTASSIUM SERPL-SCNC: 3.6 MMOL/L (ref 3.5–5.2)
RBC # BLD AUTO: 3.56 10*6/MM3 (ref 4.14–5.8)
RBC # BLD AUTO: 3.66 10*6/MM3 (ref 4.14–5.8)
SODIUM SERPL-SCNC: 140 MMOL/L (ref 136–145)
WBC # BLD AUTO: 12.26 10*3/MM3 (ref 3.4–10.8)
WBC # BLD AUTO: 12.91 10*3/MM3 (ref 3.4–10.8)

## 2021-03-28 PROCEDURE — 94799 UNLISTED PULMONARY SVC/PX: CPT

## 2021-03-28 PROCEDURE — 85025 COMPLETE CBC W/AUTO DIFF WBC: CPT | Performed by: INTERNAL MEDICINE

## 2021-03-28 PROCEDURE — 25010000002 PIPERACILLIN SOD-TAZOBACTAM PER 1 G: Performed by: INTERNAL MEDICINE

## 2021-03-28 PROCEDURE — 93010 ELECTROCARDIOGRAM REPORT: CPT | Performed by: INTERNAL MEDICINE

## 2021-03-28 PROCEDURE — 85025 COMPLETE CBC W/AUTO DIFF WBC: CPT | Performed by: PHYSICIAN ASSISTANT

## 2021-03-28 PROCEDURE — 99232 SBSQ HOSP IP/OBS MODERATE 35: CPT | Performed by: INTERNAL MEDICINE

## 2021-03-28 PROCEDURE — 97116 GAIT TRAINING THERAPY: CPT

## 2021-03-28 PROCEDURE — 80048 BASIC METABOLIC PNL TOTAL CA: CPT | Performed by: PHYSICIAN ASSISTANT

## 2021-03-28 PROCEDURE — 63710000001 INSULIN LISPRO (HUMAN) PER 5 UNITS: Performed by: INTERNAL MEDICINE

## 2021-03-28 PROCEDURE — 25010000002 MORPHINE PER 10 MG: Performed by: THORACIC SURGERY (CARDIOTHORACIC VASCULAR SURGERY)

## 2021-03-28 PROCEDURE — 93005 ELECTROCARDIOGRAM TRACING: CPT | Performed by: PHYSICIAN ASSISTANT

## 2021-03-28 PROCEDURE — 82962 GLUCOSE BLOOD TEST: CPT

## 2021-03-28 PROCEDURE — 63710000001 INSULIN DETEMIR PER 5 UNITS: Performed by: INTERNAL MEDICINE

## 2021-03-28 PROCEDURE — 97530 THERAPEUTIC ACTIVITIES: CPT

## 2021-03-28 RX ORDER — CARVEDILOL 6.25 MG/1
6.25 TABLET ORAL 2 TIMES DAILY WITH MEALS
Status: DISCONTINUED | OUTPATIENT
Start: 2021-03-28 | End: 2021-04-01 | Stop reason: HOSPADM

## 2021-03-28 RX ORDER — AMOXICILLIN AND CLAVULANATE POTASSIUM 875; 125 MG/1; MG/1
1 TABLET, FILM COATED ORAL EVERY 12 HOURS SCHEDULED
Status: DISCONTINUED | OUTPATIENT
Start: 2021-03-28 | End: 2021-04-01 | Stop reason: HOSPADM

## 2021-03-28 RX ORDER — DOXYCYCLINE 100 MG/1
100 CAPSULE ORAL EVERY 12 HOURS SCHEDULED
Status: DISCONTINUED | OUTPATIENT
Start: 2021-03-28 | End: 2021-04-01 | Stop reason: HOSPADM

## 2021-03-28 RX ADMIN — INSULIN DETEMIR 20 UNITS: 100 INJECTION, SOLUTION SUBCUTANEOUS at 21:07

## 2021-03-28 RX ADMIN — HYDROCODONE BITARTRATE AND ACETAMINOPHEN 1 TABLET: 7.5; 325 TABLET ORAL at 12:06

## 2021-03-28 RX ADMIN — PAROXETINE HYDROCHLORIDE 40 MG: 20 TABLET, FILM COATED ORAL at 08:08

## 2021-03-28 RX ADMIN — ATORVASTATIN CALCIUM 40 MG: 40 TABLET, FILM COATED ORAL at 21:07

## 2021-03-28 RX ADMIN — AMOXICILLIN AND CLAVULANATE POTASSIUM 1 TABLET: 875; 125 TABLET, FILM COATED ORAL at 12:06

## 2021-03-28 RX ADMIN — TAZOBACTAM SODIUM AND PIPERACILLIN SODIUM 4.5 G: 500; 4 INJECTION, SOLUTION INTRAVENOUS at 06:28

## 2021-03-28 RX ADMIN — COLLAGENASE SANTYL: 250 OINTMENT TOPICAL at 12:11

## 2021-03-28 RX ADMIN — CARVEDILOL 6.25 MG: 6.25 TABLET, FILM COATED ORAL at 17:10

## 2021-03-28 RX ADMIN — AMIODARONE HYDROCHLORIDE 200 MG: 200 TABLET ORAL at 12:08

## 2021-03-28 RX ADMIN — DOXYCYCLINE 100 MG: 100 CAPSULE ORAL at 21:07

## 2021-03-28 RX ADMIN — VENLAFAXINE 75 MG: 37.5 TABLET ORAL at 08:08

## 2021-03-28 RX ADMIN — AMIODARONE HYDROCHLORIDE 200 MG: 200 TABLET ORAL at 17:10

## 2021-03-28 RX ADMIN — INSULIN LISPRO 4 UNITS: 100 INJECTION, SOLUTION INTRAVENOUS; SUBCUTANEOUS at 12:10

## 2021-03-28 RX ADMIN — CASTOR OIL AND BALSAM, PERU: 788; 87 OINTMENT TOPICAL at 21:08

## 2021-03-28 RX ADMIN — ASPIRIN 325 MG: 325 TABLET, COATED ORAL at 08:08

## 2021-03-28 RX ADMIN — HYDROCODONE BITARTRATE AND ACETAMINOPHEN 1 TABLET: 7.5; 325 TABLET ORAL at 21:07

## 2021-03-28 RX ADMIN — MORPHINE SULFATE 2 MG: 2 INJECTION, SOLUTION INTRAMUSCULAR; INTRAVENOUS at 06:27

## 2021-03-28 RX ADMIN — INSULIN LISPRO 8 UNITS: 100 INJECTION, SOLUTION INTRAVENOUS; SUBCUTANEOUS at 21:07

## 2021-03-28 RX ADMIN — SODIUM CHLORIDE, PRESERVATIVE FREE 10 ML: 5 INJECTION INTRAVENOUS at 21:07

## 2021-03-28 RX ADMIN — METOPROLOL TARTRATE 12.5 MG: 25 TABLET, FILM COATED ORAL at 08:08

## 2021-03-28 RX ADMIN — SODIUM CHLORIDE, PRESERVATIVE FREE 10 ML: 5 INJECTION INTRAVENOUS at 08:09

## 2021-03-28 RX ADMIN — PANTOPRAZOLE SODIUM 40 MG: 40 INJECTION, POWDER, FOR SOLUTION INTRAVENOUS at 06:27

## 2021-03-28 RX ADMIN — AMIODARONE HYDROCHLORIDE 200 MG: 200 TABLET ORAL at 08:07

## 2021-03-28 RX ADMIN — INSULIN LISPRO 4 UNITS: 100 INJECTION, SOLUTION INTRAVENOUS; SUBCUTANEOUS at 17:09

## 2021-03-28 RX ADMIN — DOXYCYCLINE 100 MG: 100 CAPSULE ORAL at 12:06

## 2021-03-28 RX ADMIN — CASTOR OIL AND BALSAM, PERU: 788; 87 OINTMENT TOPICAL at 08:10

## 2021-03-28 RX ADMIN — AMOXICILLIN AND CLAVULANATE POTASSIUM 1 TABLET: 875; 125 TABLET, FILM COATED ORAL at 21:07

## 2021-03-29 ENCOUNTER — APPOINTMENT (OUTPATIENT)
Dept: PULMONOLOGY | Facility: HOSPITAL | Age: 61
End: 2021-03-29

## 2021-03-29 ENCOUNTER — APPOINTMENT (OUTPATIENT)
Dept: GENERAL RADIOLOGY | Facility: HOSPITAL | Age: 61
End: 2021-03-29

## 2021-03-29 LAB
ANION GAP SERPL CALCULATED.3IONS-SCNC: 10 MMOL/L (ref 5–15)
BASOPHILS # BLD AUTO: 0.05 10*3/MM3 (ref 0–0.2)
BASOPHILS NFR BLD AUTO: 0.4 % (ref 0–1.5)
BUN SERPL-MCNC: 32 MG/DL (ref 8–23)
BUN/CREAT SERPL: 18.8 (ref 7–25)
CALCIUM SPEC-SCNC: 8.3 MG/DL (ref 8.6–10.5)
CHLORIDE SERPL-SCNC: 106 MMOL/L (ref 98–107)
CO2 SERPL-SCNC: 23 MMOL/L (ref 22–29)
CREAT SERPL-MCNC: 1.7 MG/DL (ref 0.76–1.27)
DEPRECATED RDW RBC AUTO: 50.3 FL (ref 37–54)
EOSINOPHIL # BLD AUTO: 0.83 10*3/MM3 (ref 0–0.4)
EOSINOPHIL NFR BLD AUTO: 6.5 % (ref 0.3–6.2)
ERYTHROCYTE [DISTWIDTH] IN BLOOD BY AUTOMATED COUNT: 14.7 % (ref 12.3–15.4)
GFR SERPL CREATININE-BSD FRML MDRD: 41 ML/MIN/1.73
GLUCOSE BLDC GLUCOMTR-MCNC: 170 MG/DL (ref 70–130)
GLUCOSE BLDC GLUCOMTR-MCNC: 262 MG/DL (ref 70–130)
GLUCOSE BLDC GLUCOMTR-MCNC: 274 MG/DL (ref 70–130)
GLUCOSE SERPL-MCNC: 143 MG/DL (ref 65–99)
GLUCOSE SERPL-MCNC: 154 MG/DL (ref 65–99)
HCT VFR BLD AUTO: 34.1 % (ref 37.5–51)
HGB BLD-MCNC: 10.8 G/DL (ref 13–17.7)
IMM GRANULOCYTES # BLD AUTO: 0.1 10*3/MM3 (ref 0–0.05)
IMM GRANULOCYTES NFR BLD AUTO: 0.8 % (ref 0–0.5)
LYMPHOCYTES # BLD AUTO: 1.64 10*3/MM3 (ref 0.7–3.1)
LYMPHOCYTES NFR BLD AUTO: 12.8 % (ref 19.6–45.3)
MCH RBC QN AUTO: 29.4 PG (ref 26.6–33)
MCHC RBC AUTO-ENTMCNC: 31.7 G/DL (ref 31.5–35.7)
MCV RBC AUTO: 92.9 FL (ref 79–97)
MONOCYTES # BLD AUTO: 1.78 10*3/MM3 (ref 0.1–0.9)
MONOCYTES NFR BLD AUTO: 13.9 % (ref 5–12)
NEUTROPHILS NFR BLD AUTO: 65.6 % (ref 42.7–76)
NEUTROPHILS NFR BLD AUTO: 8.37 10*3/MM3 (ref 1.7–7)
NRBC BLD AUTO-RTO: 0 /100 WBC (ref 0–0.2)
PLATELET # BLD AUTO: 238 10*3/MM3 (ref 140–450)
PMV BLD AUTO: 11.5 FL (ref 6–12)
POTASSIUM SERPL-SCNC: 3.8 MMOL/L (ref 3.5–5.2)
QT INTERVAL: 424 MS
QTC INTERVAL: 464 MS
RBC # BLD AUTO: 3.67 10*6/MM3 (ref 4.14–5.8)
SODIUM SERPL-SCNC: 139 MMOL/L (ref 136–145)
WBC # BLD AUTO: 12.77 10*3/MM3 (ref 3.4–10.8)

## 2021-03-29 PROCEDURE — 80048 BASIC METABOLIC PNL TOTAL CA: CPT | Performed by: INTERNAL MEDICINE

## 2021-03-29 PROCEDURE — 97530 THERAPEUTIC ACTIVITIES: CPT

## 2021-03-29 PROCEDURE — 63710000001 INSULIN LISPRO (HUMAN) PER 5 UNITS: Performed by: INTERNAL MEDICINE

## 2021-03-29 PROCEDURE — 25010000002 HEPARIN (PORCINE) PER 1000 UNITS: Performed by: INTERNAL MEDICINE

## 2021-03-29 PROCEDURE — 71045 X-RAY EXAM CHEST 1 VIEW: CPT

## 2021-03-29 PROCEDURE — 94010 BREATHING CAPACITY TEST: CPT | Performed by: INTERNAL MEDICINE

## 2021-03-29 PROCEDURE — 94010 BREATHING CAPACITY TEST: CPT

## 2021-03-29 PROCEDURE — 99232 SBSQ HOSP IP/OBS MODERATE 35: CPT | Performed by: NURSE PRACTITIONER

## 2021-03-29 PROCEDURE — 63710000001 INSULIN DETEMIR PER 5 UNITS: Performed by: INTERNAL MEDICINE

## 2021-03-29 PROCEDURE — 82947 ASSAY GLUCOSE BLOOD QUANT: CPT | Performed by: INTERNAL MEDICINE

## 2021-03-29 PROCEDURE — 85025 COMPLETE CBC W/AUTO DIFF WBC: CPT | Performed by: INTERNAL MEDICINE

## 2021-03-29 PROCEDURE — 25010000002 FUROSEMIDE PER 20 MG: Performed by: INTERNAL MEDICINE

## 2021-03-29 PROCEDURE — 97110 THERAPEUTIC EXERCISES: CPT

## 2021-03-29 PROCEDURE — 99232 SBSQ HOSP IP/OBS MODERATE 35: CPT | Performed by: INTERNAL MEDICINE

## 2021-03-29 RX ORDER — AMIODARONE HYDROCHLORIDE 200 MG/1
200 TABLET ORAL EVERY 12 HOURS SCHEDULED
Status: DISCONTINUED | OUTPATIENT
Start: 2021-04-03 | End: 2021-04-01 | Stop reason: HOSPADM

## 2021-03-29 RX ORDER — AMIODARONE HYDROCHLORIDE 200 MG/1
200 TABLET ORAL
Status: DISCONTINUED | OUTPATIENT
Start: 2021-04-17 | End: 2021-04-01 | Stop reason: HOSPADM

## 2021-03-29 RX ORDER — PAROXETINE HYDROCHLORIDE HEMIHYDRATE 12.5 MG/1
37.5 TABLET, FILM COATED, EXTENDED RELEASE ORAL DAILY
Status: DISCONTINUED | OUTPATIENT
Start: 2021-03-29 | End: 2021-04-01 | Stop reason: HOSPADM

## 2021-03-29 RX ORDER — HEPARIN SODIUM 5000 [USP'U]/ML
5000 INJECTION, SOLUTION INTRAVENOUS; SUBCUTANEOUS EVERY 8 HOURS SCHEDULED
Status: DISCONTINUED | OUTPATIENT
Start: 2021-03-29 | End: 2021-04-01 | Stop reason: HOSPADM

## 2021-03-29 RX ORDER — FUROSEMIDE 10 MG/ML
80 INJECTION INTRAMUSCULAR; INTRAVENOUS ONCE
Status: COMPLETED | OUTPATIENT
Start: 2021-03-29 | End: 2021-03-29

## 2021-03-29 RX ADMIN — PAROXETINE HYDROCHLORIDE 37.5 MG: 12.5 TABLET, FILM COATED, EXTENDED RELEASE ORAL at 10:37

## 2021-03-29 RX ADMIN — INSULIN LISPRO 4 UNITS: 100 INJECTION, SOLUTION INTRAVENOUS; SUBCUTANEOUS at 11:18

## 2021-03-29 RX ADMIN — CASTOR OIL AND BALSAM, PERU: 788; 87 OINTMENT TOPICAL at 11:18

## 2021-03-29 RX ADMIN — COLLAGENASE SANTYL 1 APPLICATION: 250 OINTMENT TOPICAL at 11:18

## 2021-03-29 RX ADMIN — HEPARIN SODIUM 5000 UNITS: 5000 INJECTION INTRAVENOUS; SUBCUTANEOUS at 16:05

## 2021-03-29 RX ADMIN — AMIODARONE HYDROCHLORIDE 200 MG: 200 TABLET ORAL at 11:30

## 2021-03-29 RX ADMIN — AMIODARONE HYDROCHLORIDE 200 MG: 200 TABLET ORAL at 08:11

## 2021-03-29 RX ADMIN — ATORVASTATIN CALCIUM 40 MG: 40 TABLET, FILM COATED ORAL at 20:36

## 2021-03-29 RX ADMIN — CASTOR OIL AND BALSAM, PERU: 788; 87 OINTMENT TOPICAL at 23:59

## 2021-03-29 RX ADMIN — CARVEDILOL 6.25 MG: 6.25 TABLET, FILM COATED ORAL at 18:08

## 2021-03-29 RX ADMIN — INSULIN LISPRO 8 UNITS: 100 INJECTION, SOLUTION INTRAVENOUS; SUBCUTANEOUS at 18:08

## 2021-03-29 RX ADMIN — PANTOPRAZOLE SODIUM 40 MG: 40 INJECTION, POWDER, FOR SOLUTION INTRAVENOUS at 05:59

## 2021-03-29 RX ADMIN — HEPARIN SODIUM 5000 UNITS: 5000 INJECTION INTRAVENOUS; SUBCUTANEOUS at 20:36

## 2021-03-29 RX ADMIN — FUROSEMIDE 80 MG: 10 INJECTION, SOLUTION INTRAVENOUS at 15:51

## 2021-03-29 RX ADMIN — DOXYCYCLINE 100 MG: 100 CAPSULE ORAL at 08:11

## 2021-03-29 RX ADMIN — AMOXICILLIN AND CLAVULANATE POTASSIUM 1 TABLET: 875; 125 TABLET, FILM COATED ORAL at 20:47

## 2021-03-29 RX ADMIN — AMOXICILLIN AND CLAVULANATE POTASSIUM 1 TABLET: 875; 125 TABLET, FILM COATED ORAL at 08:11

## 2021-03-29 RX ADMIN — AMIODARONE HYDROCHLORIDE 200 MG: 200 TABLET ORAL at 18:07

## 2021-03-29 RX ADMIN — SODIUM CHLORIDE, PRESERVATIVE FREE 10 ML: 5 INJECTION INTRAVENOUS at 23:58

## 2021-03-29 RX ADMIN — INSULIN DETEMIR 20 UNITS: 100 INJECTION, SOLUTION SUBCUTANEOUS at 20:36

## 2021-03-29 RX ADMIN — DOXYCYCLINE 100 MG: 100 CAPSULE ORAL at 20:36

## 2021-03-29 RX ADMIN — VENLAFAXINE 75 MG: 37.5 TABLET ORAL at 08:11

## 2021-03-29 RX ADMIN — CARVEDILOL 6.25 MG: 6.25 TABLET, FILM COATED ORAL at 08:12

## 2021-03-29 RX ADMIN — INSULIN LISPRO 12 UNITS: 100 INJECTION, SOLUTION INTRAVENOUS; SUBCUTANEOUS at 20:36

## 2021-03-29 RX ADMIN — HYDROCODONE BITARTRATE AND ACETAMINOPHEN 1 TABLET: 7.5; 325 TABLET ORAL at 23:58

## 2021-03-29 RX ADMIN — ASPIRIN 325 MG: 325 TABLET, COATED ORAL at 08:11

## 2021-03-30 ENCOUNTER — APPOINTMENT (OUTPATIENT)
Dept: GENERAL RADIOLOGY | Facility: HOSPITAL | Age: 61
End: 2021-03-30

## 2021-03-30 PROBLEM — G47.34 NOCTURNAL HYPOXEMIA: Status: ACTIVE | Noted: 2021-03-30

## 2021-03-30 PROBLEM — I48.0 PAF (PAROXYSMAL ATRIAL FIBRILLATION) (HCC): Status: ACTIVE | Noted: 2021-03-30

## 2021-03-30 LAB
ALBUMIN SERPL-MCNC: 2.6 G/DL (ref 3.5–5.2)
ALBUMIN/GLOB SERPL: 0.7 G/DL
ALP SERPL-CCNC: 104 U/L (ref 39–117)
ALT SERPL W P-5'-P-CCNC: 8 U/L (ref 1–41)
ANION GAP SERPL CALCULATED.3IONS-SCNC: 10 MMOL/L (ref 5–15)
AST SERPL-CCNC: 10 U/L (ref 1–40)
BASOPHILS # BLD AUTO: 0.06 10*3/MM3 (ref 0–0.2)
BASOPHILS NFR BLD AUTO: 0.5 % (ref 0–1.5)
BILIRUB SERPL-MCNC: 0.7 MG/DL (ref 0–1.2)
BUN SERPL-MCNC: 37 MG/DL (ref 8–23)
BUN/CREAT SERPL: 24.7 (ref 7–25)
CALCIUM SPEC-SCNC: 7.9 MG/DL (ref 8.6–10.5)
CHLORIDE SERPL-SCNC: 106 MMOL/L (ref 98–107)
CO2 SERPL-SCNC: 23 MMOL/L (ref 22–29)
CREAT SERPL-MCNC: 1.5 MG/DL (ref 0.76–1.27)
DEPRECATED RDW RBC AUTO: 48.4 FL (ref 37–54)
EOSINOPHIL # BLD AUTO: 0.86 10*3/MM3 (ref 0–0.4)
EOSINOPHIL NFR BLD AUTO: 7.1 % (ref 0.3–6.2)
ERYTHROCYTE [DISTWIDTH] IN BLOOD BY AUTOMATED COUNT: 14.4 % (ref 12.3–15.4)
GFR SERPL CREATININE-BSD FRML MDRD: 47 ML/MIN/1.73
GLOBULIN UR ELPH-MCNC: 3.6 GM/DL
GLUCOSE BLDC GLUCOMTR-MCNC: 128 MG/DL (ref 70–130)
GLUCOSE BLDC GLUCOMTR-MCNC: 152 MG/DL (ref 70–130)
GLUCOSE BLDC GLUCOMTR-MCNC: 229 MG/DL (ref 70–130)
GLUCOSE BLDC GLUCOMTR-MCNC: 243 MG/DL (ref 70–130)
GLUCOSE SERPL-MCNC: 132 MG/DL (ref 65–99)
HCT VFR BLD AUTO: 33.7 % (ref 37.5–51)
HGB BLD-MCNC: 11 G/DL (ref 13–17.7)
IMM GRANULOCYTES # BLD AUTO: 0.13 10*3/MM3 (ref 0–0.05)
IMM GRANULOCYTES NFR BLD AUTO: 1.1 % (ref 0–0.5)
LYMPHOCYTES # BLD AUTO: 1.72 10*3/MM3 (ref 0.7–3.1)
LYMPHOCYTES NFR BLD AUTO: 14.2 % (ref 19.6–45.3)
MAGNESIUM SERPL-MCNC: 2.1 MG/DL (ref 1.6–2.4)
MCH RBC QN AUTO: 29.9 PG (ref 26.6–33)
MCHC RBC AUTO-ENTMCNC: 32.6 G/DL (ref 31.5–35.7)
MCV RBC AUTO: 91.6 FL (ref 79–97)
MONOCYTES # BLD AUTO: 1.45 10*3/MM3 (ref 0.1–0.9)
MONOCYTES NFR BLD AUTO: 12 % (ref 5–12)
NEUTROPHILS NFR BLD AUTO: 65.1 % (ref 42.7–76)
NEUTROPHILS NFR BLD AUTO: 7.9 10*3/MM3 (ref 1.7–7)
NRBC BLD AUTO-RTO: 0 /100 WBC (ref 0–0.2)
NT-PROBNP SERPL-MCNC: ABNORMAL PG/ML (ref 0–900)
PHOSPHATE SERPL-MCNC: 2.5 MG/DL (ref 2.5–4.5)
PLATELET # BLD AUTO: 295 10*3/MM3 (ref 140–450)
PMV BLD AUTO: 11.1 FL (ref 6–12)
POTASSIUM SERPL-SCNC: 3.5 MMOL/L (ref 3.5–5.2)
POTASSIUM SERPL-SCNC: 4.3 MMOL/L (ref 3.5–5.2)
PROT SERPL-MCNC: 6.2 G/DL (ref 6–8.5)
QT INTERVAL: 366 MS
QT INTERVAL: 422 MS
QTC INTERVAL: 471 MS
QTC INTERVAL: 525 MS
RBC # BLD AUTO: 3.68 10*6/MM3 (ref 4.14–5.8)
SODIUM SERPL-SCNC: 139 MMOL/L (ref 136–145)
WBC # BLD AUTO: 12.12 10*3/MM3 (ref 3.4–10.8)

## 2021-03-30 PROCEDURE — 84100 ASSAY OF PHOSPHORUS: CPT | Performed by: INTERNAL MEDICINE

## 2021-03-30 PROCEDURE — 94799 UNLISTED PULMONARY SVC/PX: CPT

## 2021-03-30 PROCEDURE — 71045 X-RAY EXAM CHEST 1 VIEW: CPT

## 2021-03-30 PROCEDURE — 83880 ASSAY OF NATRIURETIC PEPTIDE: CPT | Performed by: INTERNAL MEDICINE

## 2021-03-30 PROCEDURE — 83735 ASSAY OF MAGNESIUM: CPT | Performed by: INTERNAL MEDICINE

## 2021-03-30 PROCEDURE — 82962 GLUCOSE BLOOD TEST: CPT

## 2021-03-30 PROCEDURE — 25010000002 HEPARIN (PORCINE) PER 1000 UNITS: Performed by: INTERNAL MEDICINE

## 2021-03-30 PROCEDURE — 63710000001 INSULIN DETEMIR PER 5 UNITS: Performed by: INTERNAL MEDICINE

## 2021-03-30 PROCEDURE — 85025 COMPLETE CBC W/AUTO DIFF WBC: CPT | Performed by: INTERNAL MEDICINE

## 2021-03-30 PROCEDURE — 84132 ASSAY OF SERUM POTASSIUM: CPT | Performed by: THORACIC SURGERY (CARDIOTHORACIC VASCULAR SURGERY)

## 2021-03-30 PROCEDURE — 80053 COMPREHEN METABOLIC PANEL: CPT | Performed by: INTERNAL MEDICINE

## 2021-03-30 PROCEDURE — 99232 SBSQ HOSP IP/OBS MODERATE 35: CPT | Performed by: INTERNAL MEDICINE

## 2021-03-30 PROCEDURE — 63710000001 INSULIN LISPRO (HUMAN) PER 5 UNITS: Performed by: INTERNAL MEDICINE

## 2021-03-30 PROCEDURE — 94660 CPAP INITIATION&MGMT: CPT

## 2021-03-30 RX ORDER — TORSEMIDE 20 MG/1
10 TABLET ORAL DAILY
Status: DISCONTINUED | OUTPATIENT
Start: 2021-03-30 | End: 2021-04-01 | Stop reason: HOSPADM

## 2021-03-30 RX ORDER — TORSEMIDE 10 MG/1
5 TABLET ORAL DAILY
Status: DISCONTINUED | OUTPATIENT
Start: 2021-03-30 | End: 2021-03-30

## 2021-03-30 RX ADMIN — INSULIN LISPRO 4 UNITS: 100 INJECTION, SOLUTION INTRAVENOUS; SUBCUTANEOUS at 12:29

## 2021-03-30 RX ADMIN — HEPARIN SODIUM 5000 UNITS: 5000 INJECTION INTRAVENOUS; SUBCUTANEOUS at 14:59

## 2021-03-30 RX ADMIN — HYDROCODONE BITARTRATE AND ACETAMINOPHEN 1 TABLET: 7.5; 325 TABLET ORAL at 20:39

## 2021-03-30 RX ADMIN — HEPARIN SODIUM 5000 UNITS: 5000 INJECTION INTRAVENOUS; SUBCUTANEOUS at 20:40

## 2021-03-30 RX ADMIN — INSULIN LISPRO 8 UNITS: 100 INJECTION, SOLUTION INTRAVENOUS; SUBCUTANEOUS at 20:41

## 2021-03-30 RX ADMIN — PANTOPRAZOLE SODIUM 40 MG: 40 INJECTION, POWDER, FOR SOLUTION INTRAVENOUS at 05:59

## 2021-03-30 RX ADMIN — CASTOR OIL AND BALSAM, PERU: 788; 87 OINTMENT TOPICAL at 20:43

## 2021-03-30 RX ADMIN — HEPARIN SODIUM 5000 UNITS: 5000 INJECTION INTRAVENOUS; SUBCUTANEOUS at 05:59

## 2021-03-30 RX ADMIN — DOXYCYCLINE 100 MG: 100 CAPSULE ORAL at 08:26

## 2021-03-30 RX ADMIN — CASTOR OIL AND BALSAM, PERU: 788; 87 OINTMENT TOPICAL at 08:33

## 2021-03-30 RX ADMIN — DOXYCYCLINE 100 MG: 100 CAPSULE ORAL at 20:39

## 2021-03-30 RX ADMIN — PAROXETINE HYDROCHLORIDE 37.5 MG: 12.5 TABLET, FILM COATED, EXTENDED RELEASE ORAL at 08:26

## 2021-03-30 RX ADMIN — INSULIN DETEMIR 20 UNITS: 100 INJECTION, SOLUTION SUBCUTANEOUS at 20:40

## 2021-03-30 RX ADMIN — CARVEDILOL 6.25 MG: 6.25 TABLET, FILM COATED ORAL at 18:43

## 2021-03-30 RX ADMIN — AMIODARONE HYDROCHLORIDE 200 MG: 200 TABLET ORAL at 18:42

## 2021-03-30 RX ADMIN — AMOXICILLIN AND CLAVULANATE POTASSIUM 1 TABLET: 875; 125 TABLET, FILM COATED ORAL at 20:39

## 2021-03-30 RX ADMIN — COLLAGENASE SANTYL: 250 OINTMENT TOPICAL at 08:33

## 2021-03-30 RX ADMIN — ATORVASTATIN CALCIUM 40 MG: 40 TABLET, FILM COATED ORAL at 20:39

## 2021-03-30 RX ADMIN — VENLAFAXINE 75 MG: 37.5 TABLET ORAL at 08:26

## 2021-03-30 RX ADMIN — AMOXICILLIN AND CLAVULANATE POTASSIUM 1 TABLET: 875; 125 TABLET, FILM COATED ORAL at 08:26

## 2021-03-30 RX ADMIN — AMIODARONE HYDROCHLORIDE 200 MG: 200 TABLET ORAL at 08:26

## 2021-03-30 RX ADMIN — INSULIN LISPRO 8 UNITS: 100 INJECTION, SOLUTION INTRAVENOUS; SUBCUTANEOUS at 18:42

## 2021-03-30 RX ADMIN — ASPIRIN 325 MG: 325 TABLET, COATED ORAL at 08:26

## 2021-03-30 RX ADMIN — AMIODARONE HYDROCHLORIDE 200 MG: 200 TABLET ORAL at 12:34

## 2021-03-30 RX ADMIN — TORSEMIDE 10 MG: 20 TABLET ORAL at 08:31

## 2021-03-30 RX ADMIN — POTASSIUM CHLORIDE 40 MEQ: 1.5 POWDER, FOR SOLUTION ORAL at 05:58

## 2021-03-30 RX ADMIN — SODIUM CHLORIDE, PRESERVATIVE FREE 10 ML: 5 INJECTION INTRAVENOUS at 20:42

## 2021-03-30 RX ADMIN — CARVEDILOL 6.25 MG: 6.25 TABLET, FILM COATED ORAL at 08:26

## 2021-03-31 ENCOUNTER — APPOINTMENT (OUTPATIENT)
Dept: CARDIOLOGY | Facility: HOSPITAL | Age: 61
End: 2021-03-31

## 2021-03-31 LAB
ANION GAP SERPL CALCULATED.3IONS-SCNC: 8 MMOL/L (ref 5–15)
BACTERIA SPEC AEROBE CULT: NORMAL
BACTERIA SPEC AEROBE CULT: NORMAL
BH CV ECHO MEAS - BSA(HAYCOCK): 2.5 M^2
BH CV ECHO MEAS - BSA: 2.3 M^2
BH CV ECHO MEAS - BZI_BMI: 37 KILOGRAMS/M^2
BH CV ECHO MEAS - BZI_METRIC_HEIGHT: 177.8 CM
BH CV ECHO MEAS - BZI_METRIC_WEIGHT: 117 KG
BH CV ECHO MEAS - EDV(CUBED): 123.6 ML
BH CV ECHO MEAS - EDV(TEICH): 117.2 ML
BH CV ECHO MEAS - EF(CUBED): 24 %
BH CV ECHO MEAS - EF(MOD-BP): 20 %
BH CV ECHO MEAS - EF(TEICH): 19.2 %
BH CV ECHO MEAS - ESV(CUBED): 93.9 ML
BH CV ECHO MEAS - ESV(TEICH): 94.6 ML
BH CV ECHO MEAS - FS: 8.7 %
BH CV ECHO MEAS - LVIDD: 5 CM
BH CV ECHO MEAS - LVIDS: 4.5 CM
BH CV ECHO MEAS - SI(CUBED): 12.8 ML/M^2
BH CV ECHO MEAS - SI(TEICH): 9.7 ML/M^2
BH CV ECHO MEAS - SV(CUBED): 29.7 ML
BH CV ECHO MEAS - SV(TEICH): 22.5 ML
BH CV VAS BP LEFT ARM: NORMAL MMHG
BUN SERPL-MCNC: 37 MG/DL (ref 8–23)
BUN/CREAT SERPL: 25.7 (ref 7–25)
CALCIUM SPEC-SCNC: 8 MG/DL (ref 8.6–10.5)
CHLORIDE SERPL-SCNC: 106 MMOL/L (ref 98–107)
CO2 SERPL-SCNC: 24 MMOL/L (ref 22–29)
CREAT SERPL-MCNC: 1.44 MG/DL (ref 0.76–1.27)
GFR SERPL CREATININE-BSD FRML MDRD: 50 ML/MIN/1.73
GLUCOSE BLDC GLUCOMTR-MCNC: 150 MG/DL (ref 70–130)
GLUCOSE BLDC GLUCOMTR-MCNC: 166 MG/DL (ref 70–130)
GLUCOSE BLDC GLUCOMTR-MCNC: 207 MG/DL (ref 70–130)
GLUCOSE BLDC GLUCOMTR-MCNC: 215 MG/DL (ref 70–130)
GLUCOSE SERPL-MCNC: 142 MG/DL (ref 65–99)
LV EF 2D ECHO EST: 25 %
POTASSIUM SERPL-SCNC: 4.3 MMOL/L (ref 3.5–5.2)
SODIUM SERPL-SCNC: 138 MMOL/L (ref 136–145)

## 2021-03-31 PROCEDURE — 94799 UNLISTED PULMONARY SVC/PX: CPT

## 2021-03-31 PROCEDURE — 82962 GLUCOSE BLOOD TEST: CPT

## 2021-03-31 PROCEDURE — 25010000002 SULFUR HEXAFLUORIDE MICROSPH 60.7-25 MG RECONSTITUTED SUSPENSION: Performed by: INTERNAL MEDICINE

## 2021-03-31 PROCEDURE — 94660 CPAP INITIATION&MGMT: CPT

## 2021-03-31 PROCEDURE — 93308 TTE F-UP OR LMTD: CPT | Performed by: INTERNAL MEDICINE

## 2021-03-31 PROCEDURE — 99232 SBSQ HOSP IP/OBS MODERATE 35: CPT | Performed by: INTERNAL MEDICINE

## 2021-03-31 PROCEDURE — 25010000002 HEPARIN (PORCINE) PER 1000 UNITS: Performed by: INTERNAL MEDICINE

## 2021-03-31 PROCEDURE — 63710000001 INSULIN DETEMIR PER 5 UNITS: Performed by: INTERNAL MEDICINE

## 2021-03-31 PROCEDURE — 80048 BASIC METABOLIC PNL TOTAL CA: CPT | Performed by: INTERNAL MEDICINE

## 2021-03-31 PROCEDURE — 63710000001 INSULIN LISPRO (HUMAN) PER 5 UNITS: Performed by: INTERNAL MEDICINE

## 2021-03-31 PROCEDURE — 93308 TTE F-UP OR LMTD: CPT

## 2021-03-31 PROCEDURE — 25010000002 FUROSEMIDE PER 20 MG: Performed by: INTERNAL MEDICINE

## 2021-03-31 RX ORDER — FUROSEMIDE 10 MG/ML
40 INJECTION INTRAMUSCULAR; INTRAVENOUS ONCE
Status: COMPLETED | OUTPATIENT
Start: 2021-03-31 | End: 2021-03-31

## 2021-03-31 RX ORDER — HYDROCODONE BITARTRATE AND ACETAMINOPHEN 7.5; 325 MG/1; MG/1
1 TABLET ORAL EVERY 6 HOURS PRN
Qty: 30 TABLET | Refills: 0 | Status: SHIPPED | OUTPATIENT
Start: 2021-03-31

## 2021-03-31 RX ADMIN — ASPIRIN 325 MG: 325 TABLET, COATED ORAL at 08:39

## 2021-03-31 RX ADMIN — INSULIN LISPRO 4 UNITS: 100 INJECTION, SOLUTION INTRAVENOUS; SUBCUTANEOUS at 08:40

## 2021-03-31 RX ADMIN — AMIODARONE HYDROCHLORIDE 200 MG: 200 TABLET ORAL at 12:29

## 2021-03-31 RX ADMIN — DOXYCYCLINE 100 MG: 100 CAPSULE ORAL at 20:54

## 2021-03-31 RX ADMIN — CASTOR OIL AND BALSAM, PERU: 788; 87 OINTMENT TOPICAL at 20:55

## 2021-03-31 RX ADMIN — HEPARIN SODIUM 5000 UNITS: 5000 INJECTION INTRAVENOUS; SUBCUTANEOUS at 05:54

## 2021-03-31 RX ADMIN — INSULIN LISPRO 8 UNITS: 100 INJECTION, SOLUTION INTRAVENOUS; SUBCUTANEOUS at 18:00

## 2021-03-31 RX ADMIN — FUROSEMIDE 40 MG: 10 INJECTION, SOLUTION INTRAVENOUS at 18:00

## 2021-03-31 RX ADMIN — CARVEDILOL 6.25 MG: 6.25 TABLET, FILM COATED ORAL at 18:01

## 2021-03-31 RX ADMIN — DOXYCYCLINE 100 MG: 100 CAPSULE ORAL at 08:39

## 2021-03-31 RX ADMIN — INSULIN DETEMIR 20 UNITS: 100 INJECTION, SOLUTION SUBCUTANEOUS at 20:55

## 2021-03-31 RX ADMIN — SODIUM CHLORIDE, PRESERVATIVE FREE 10 ML: 5 INJECTION INTRAVENOUS at 08:40

## 2021-03-31 RX ADMIN — CARVEDILOL 6.25 MG: 6.25 TABLET, FILM COATED ORAL at 08:39

## 2021-03-31 RX ADMIN — AMOXICILLIN AND CLAVULANATE POTASSIUM 1 TABLET: 875; 125 TABLET, FILM COATED ORAL at 08:39

## 2021-03-31 RX ADMIN — ATORVASTATIN CALCIUM 40 MG: 40 TABLET, FILM COATED ORAL at 20:53

## 2021-03-31 RX ADMIN — AMIODARONE HYDROCHLORIDE 200 MG: 200 TABLET ORAL at 18:01

## 2021-03-31 RX ADMIN — INSULIN LISPRO 8 UNITS: 100 INJECTION, SOLUTION INTRAVENOUS; SUBCUTANEOUS at 20:53

## 2021-03-31 RX ADMIN — COLLAGENASE SANTYL: 250 OINTMENT TOPICAL at 08:42

## 2021-03-31 RX ADMIN — CASTOR OIL AND BALSAM, PERU: 788; 87 OINTMENT TOPICAL at 08:39

## 2021-03-31 RX ADMIN — HYDROCODONE BITARTRATE AND ACETAMINOPHEN 1 TABLET: 7.5; 325 TABLET ORAL at 23:13

## 2021-03-31 RX ADMIN — PANTOPRAZOLE SODIUM 40 MG: 40 INJECTION, POWDER, FOR SOLUTION INTRAVENOUS at 05:54

## 2021-03-31 RX ADMIN — AMOXICILLIN AND CLAVULANATE POTASSIUM 1 TABLET: 875; 125 TABLET, FILM COATED ORAL at 20:53

## 2021-03-31 RX ADMIN — TORSEMIDE 10 MG: 20 TABLET ORAL at 08:38

## 2021-03-31 RX ADMIN — SULFUR HEXAFLUORIDE 3 ML: KIT at 11:38

## 2021-03-31 RX ADMIN — SODIUM CHLORIDE, PRESERVATIVE FREE 10 ML: 5 INJECTION INTRAVENOUS at 20:55

## 2021-03-31 RX ADMIN — INSULIN LISPRO 4 UNITS: 100 INJECTION, SOLUTION INTRAVENOUS; SUBCUTANEOUS at 12:29

## 2021-03-31 RX ADMIN — AMIODARONE HYDROCHLORIDE 200 MG: 200 TABLET ORAL at 08:38

## 2021-03-31 RX ADMIN — HEPARIN SODIUM 5000 UNITS: 5000 INJECTION INTRAVENOUS; SUBCUTANEOUS at 20:54

## 2021-03-31 RX ADMIN — VENLAFAXINE 75 MG: 37.5 TABLET ORAL at 08:38

## 2021-03-31 RX ADMIN — PAROXETINE HYDROCHLORIDE 37.5 MG: 12.5 TABLET, FILM COATED, EXTENDED RELEASE ORAL at 08:39

## 2021-04-01 VITALS
TEMPERATURE: 98.8 F | BODY MASS INDEX: 35.18 KG/M2 | OXYGEN SATURATION: 96 % | SYSTOLIC BLOOD PRESSURE: 143 MMHG | HEIGHT: 71 IN | HEART RATE: 75 BPM | RESPIRATION RATE: 20 BRPM | WEIGHT: 251.3 LBS | DIASTOLIC BLOOD PRESSURE: 78 MMHG

## 2021-04-01 LAB
ANION GAP SERPL CALCULATED.3IONS-SCNC: 9 MMOL/L (ref 5–15)
BUN SERPL-MCNC: 38 MG/DL (ref 8–23)
BUN/CREAT SERPL: 26.6 (ref 7–25)
CALCIUM SPEC-SCNC: 7.8 MG/DL (ref 8.6–10.5)
CHLORIDE SERPL-SCNC: 105 MMOL/L (ref 98–107)
CO2 SERPL-SCNC: 25 MMOL/L (ref 22–29)
CREAT SERPL-MCNC: 1.43 MG/DL (ref 0.76–1.27)
GFR SERPL CREATININE-BSD FRML MDRD: 50 ML/MIN/1.73
GLUCOSE BLDC GLUCOMTR-MCNC: 146 MG/DL (ref 70–130)
GLUCOSE BLDC GLUCOMTR-MCNC: 162 MG/DL (ref 70–130)
GLUCOSE BLDC GLUCOMTR-MCNC: 200 MG/DL (ref 70–130)
GLUCOSE SERPL-MCNC: 173 MG/DL (ref 65–99)
POTASSIUM SERPL-SCNC: 4.3 MMOL/L (ref 3.5–5.2)
SODIUM SERPL-SCNC: 139 MMOL/L (ref 136–145)

## 2021-04-01 PROCEDURE — 63710000001 INSULIN LISPRO (HUMAN) PER 5 UNITS: Performed by: INTERNAL MEDICINE

## 2021-04-01 PROCEDURE — 82962 GLUCOSE BLOOD TEST: CPT

## 2021-04-01 PROCEDURE — 99232 SBSQ HOSP IP/OBS MODERATE 35: CPT | Performed by: INTERNAL MEDICINE

## 2021-04-01 PROCEDURE — 80048 BASIC METABOLIC PNL TOTAL CA: CPT | Performed by: INTERNAL MEDICINE

## 2021-04-01 PROCEDURE — 94799 UNLISTED PULMONARY SVC/PX: CPT

## 2021-04-01 PROCEDURE — 25010000002 HEPARIN (PORCINE) PER 1000 UNITS: Performed by: INTERNAL MEDICINE

## 2021-04-01 PROCEDURE — 94660 CPAP INITIATION&MGMT: CPT

## 2021-04-01 RX ORDER — TORSEMIDE 10 MG/1
10 TABLET ORAL DAILY
Qty: 30 TABLET | Refills: 0 | Status: SHIPPED | OUTPATIENT
Start: 2021-04-02 | End: 2021-07-27

## 2021-04-01 RX ORDER — AMIODARONE HYDROCHLORIDE 200 MG/1
200 TABLET ORAL EVERY 12 HOURS SCHEDULED
Qty: 28 TABLET | Refills: 0 | Status: SHIPPED | OUTPATIENT
Start: 2021-04-05 | End: 2021-04-19

## 2021-04-01 RX ORDER — PANTOPRAZOLE SODIUM 40 MG/1
40 TABLET, DELAYED RELEASE ORAL
Status: DISCONTINUED | OUTPATIENT
Start: 2021-04-02 | End: 2021-04-01 | Stop reason: HOSPADM

## 2021-04-01 RX ORDER — AMOXICILLIN AND CLAVULANATE POTASSIUM 875; 125 MG/1; MG/1
1 TABLET, FILM COATED ORAL EVERY 12 HOURS SCHEDULED
Qty: 19 TABLET | Refills: 0 | Status: SHIPPED | OUTPATIENT
Start: 2021-04-01 | End: 2021-04-11

## 2021-04-01 RX ORDER — AMIODARONE HYDROCHLORIDE 200 MG/1
200 TABLET ORAL
Qty: 30 TABLET | Refills: 0 | Status: SHIPPED | OUTPATIENT
Start: 2021-04-20 | End: 2021-05-20

## 2021-04-01 RX ORDER — AMIODARONE HYDROCHLORIDE 200 MG/1
200 TABLET ORAL
Qty: 4 TABLET | Refills: 0 | Status: SHIPPED | OUTPATIENT
Start: 2021-04-02 | End: 2021-04-04

## 2021-04-01 RX ORDER — CARVEDILOL 6.25 MG/1
6.25 TABLET ORAL 2 TIMES DAILY WITH MEALS
Qty: 60 TABLET | Refills: 2 | Status: SHIPPED | OUTPATIENT
Start: 2021-04-02 | End: 2021-04-27 | Stop reason: SDUPTHER

## 2021-04-01 RX ORDER — ZOLPIDEM TARTRATE 10 MG/1
10 TABLET ORAL NIGHTLY PRN
Qty: 30 TABLET | Refills: 0 | Status: SHIPPED | OUTPATIENT
Start: 2021-04-01 | End: 2021-05-01

## 2021-04-01 RX ORDER — DOXYCYCLINE 100 MG/1
100 CAPSULE ORAL EVERY 12 HOURS SCHEDULED
Qty: 19 CAPSULE | Refills: 0 | Status: SHIPPED | OUTPATIENT
Start: 2021-04-01 | End: 2021-04-11

## 2021-04-01 RX ADMIN — SODIUM CHLORIDE, PRESERVATIVE FREE 10 ML: 5 INJECTION INTRAVENOUS at 08:40

## 2021-04-01 RX ADMIN — AMIODARONE HYDROCHLORIDE 200 MG: 200 TABLET ORAL at 13:36

## 2021-04-01 RX ADMIN — CARVEDILOL 6.25 MG: 6.25 TABLET, FILM COATED ORAL at 08:40

## 2021-04-01 RX ADMIN — COLLAGENASE SANTYL: 250 OINTMENT TOPICAL at 08:43

## 2021-04-01 RX ADMIN — AMIODARONE HYDROCHLORIDE 200 MG: 200 TABLET ORAL at 08:43

## 2021-04-01 RX ADMIN — ASPIRIN 325 MG: 325 TABLET, COATED ORAL at 08:41

## 2021-04-01 RX ADMIN — INSULIN LISPRO 8 UNITS: 100 INJECTION, SOLUTION INTRAVENOUS; SUBCUTANEOUS at 17:02

## 2021-04-01 RX ADMIN — AMOXICILLIN AND CLAVULANATE POTASSIUM 1 TABLET: 875; 125 TABLET, FILM COATED ORAL at 08:42

## 2021-04-01 RX ADMIN — VENLAFAXINE 75 MG: 37.5 TABLET ORAL at 08:41

## 2021-04-01 RX ADMIN — AMIODARONE HYDROCHLORIDE 200 MG: 200 TABLET ORAL at 17:04

## 2021-04-01 RX ADMIN — CARVEDILOL 6.25 MG: 6.25 TABLET, FILM COATED ORAL at 17:05

## 2021-04-01 RX ADMIN — TORSEMIDE 10 MG: 20 TABLET ORAL at 08:41

## 2021-04-01 RX ADMIN — DOXYCYCLINE 100 MG: 100 CAPSULE ORAL at 08:41

## 2021-04-01 RX ADMIN — HEPARIN SODIUM 5000 UNITS: 5000 INJECTION INTRAVENOUS; SUBCUTANEOUS at 13:35

## 2021-04-01 RX ADMIN — HEPARIN SODIUM 5000 UNITS: 5000 INJECTION INTRAVENOUS; SUBCUTANEOUS at 05:28

## 2021-04-01 RX ADMIN — PANTOPRAZOLE SODIUM 40 MG: 40 INJECTION, POWDER, FOR SOLUTION INTRAVENOUS at 05:28

## 2021-04-01 RX ADMIN — PAROXETINE HYDROCHLORIDE 37.5 MG: 12.5 TABLET, FILM COATED, EXTENDED RELEASE ORAL at 08:44

## 2021-04-01 RX ADMIN — INSULIN LISPRO 4 UNITS: 100 INJECTION, SOLUTION INTRAVENOUS; SUBCUTANEOUS at 11:51

## 2021-04-01 RX ADMIN — CASTOR OIL AND BALSAM, PERU: 788; 87 OINTMENT TOPICAL at 08:43

## 2021-04-01 NOTE — PROGRESS NOTES
Dion De La Vega  [id number]  1960     LOS: 10 days   Patient Care Team:  Min Aponte DO as PCP - General (Internal Medicine)    Chief Complaint: Coronary artery disease.      Subjective: No complaints wants to go home    Objective:     Vital Sign Min/Max for last 24 hours  Temp  Min: 98.2 °F (36.8 °C)  Max: 98.4 °F (36.9 °C)   BP  Min: 122/73  Max: 147/87   Pulse  Min: 61  Max: 75   Resp  Min: 18  Max: 20   SpO2  Min: 83 %  Max: 100 %   No data recorded   Weight  Min: 114 kg (251 lb 4.8 oz)  Max: 117 kg (258 lb)     Flowsheet Rows      First Filed Value   Admission Height  -- [double amputee] Documented at 03/22/2021 2200   Admission Weight  112 kg (248 lb) Documented at 03/22/2021 2200          Physical Exam:    Wound: Satisfactory    Pulses:     Mediastinal and Chest Tube Drainage:       Results Review:   Results from last 7 days   Lab Units 03/30/21  0343   WBC 10*3/mm3 12.12*   HEMOGLOBIN g/dL 11.0*   HEMATOCRIT % 33.7*   PLATELETS 10*3/mm3 295     Results from last 7 days   Lab Units 03/31/21  0824   SODIUM mmol/L 138   POTASSIUM mmol/L 4.3   CHLORIDE mmol/L 106   CO2 mmol/L 24.0   BUN mg/dL 37*   CREATININE mg/dL 1.44*   GLUCOSE mg/dL 142*   CALCIUM mg/dL 8.0*             Assessment      Multiple vessel CAD    CHF (CMS/[facility])    Diabetes with HbA1C of 11.0 (CMS/[facility])    Hyperlipemia    Ischemic cardiomyopathy with LVEF 20-25%    S/P CABG x 3 on 3/24/21    PVD and bilateral BKA (CMS/[facility])    Hx of amputations of most distal phalanges both hands     Osteomyelitis R index finger due to dog bite (CMS/HCC)    Postoperative PAF (CMS/[facility])    Nocturnal hypoxemia      Patient still not trying to get his prosthesis on apparently.  Not really cooperate with physical therapy.  I think the patient needs rehab but he does not appear to be interested.  Not sure what the best option is at this point.  Discussed with nursing        Arjun Kee MD  04/01/21  07:03 EDT      Please note that portions of this note  were completed with a voice recognition program. Efforts were made to edit the dictations, but words may be mistranscribed

## 2021-04-01 NOTE — PROGRESS NOTES
[facility] at [facility]  IP Progress Note      Chief Complaint/Reason for service: #1 postop management of atrial fibrillation #2 ischemic cardiomyopathy with systolic heart failure    Subjective   Subjective: The patient is awake and alert.  He states he wants to go home.  His breathing is improved.  I explained to him the need for LifeVest and why    Past medical, surgical, social and family history reviewed in the patient's electronic medical record.    Objective     Vital Sign Min/Max for last 24 hours  Temp  Min: 98.2 °F (36.8 °C)  Max: 98.4 °F (36.9 °C)   BP  Min: 122/73  Max: 147/87   Pulse  Min: 61  Max: 75   Resp  Min: 18  Max: 20   SpO2  Min: 83 %  Max: 100 %   Flow (L/min)  Min: 2  Max: 2      Intake/Output Summary (Last 24 hours) at 4/1/2021 0749  Last data filed at 3/31/2021 2021  Gross per 24 hour   Intake 300 ml   Output 2050 ml   Net -1750 ml             Current Facility-Administered Medications:   •  amiodarone (PACERONE) tablet 200 mg, 200 mg, Oral, TID AC, Shawn Crenshaw MD, 200 mg at 03/31/21 1801  •  [START ON 4/3/2021] amiodarone (PACERONE) tablet 200 mg, 200 mg, Oral, Q12H **FOLLOWED BY** [START ON 4/17/2021] amiodarone (PACERONE) tablet 200 mg, 200 mg, Oral, Q24H, Shawn Crenshaw MD  •  amoxicillin-clavulanate (AUGMENTIN) 875-125 MG per tablet 1 tablet, 1 tablet, Oral, Q12H, Shawn Crenshaw MD, 1 tablet at 03/31/21 2053  •  aspirin EC tablet 325 mg, 325 mg, Oral, Daily, Shawn Crenshaw MD, 325 mg at 03/31/21 0839  •  atorvastatin (LIPITOR) tablet 40 mg, 40 mg, Oral, Nightly, Shawn Crenshaw MD, 40 mg at 03/31/21 2053  •  calcium gluconate 2 g in sodium chloride 0.9 % 100 mL IVPB, 2 g, Intravenous, Once PRN, Shawn Crenshaw MD  •  carvedilol (COREG) tablet 6.25 mg, 6.25 mg, Oral, BID With Meals, Shawn Crenshaw MD, 6.25 mg at 03/31/21 1801  •  castor oil-balsam peru (VENELEX) ointment, , Topical, Q12H, Shawn Crenshaw MD, Given at 03/31/21 2055  •   collagenase ointment, , Topical, Q24H, Shawn Crenshaw MD, Given at 03/31/21 0842  •  dextrose (D50W) 25 g/ 50mL Intravenous Solution 25 g, 25 g, Intravenous, Q15 Min PRN, Shawn Crenshaw MD  •  dextrose (GLUTOSE) oral gel 15 g, 15 g, Oral, Q15 Min PRN, Shawn Crenshaw MD  •  doxycycline (MONODOX) capsule 100 mg, 100 mg, Oral, Q12H, Shawn Crenshaw MD, 100 mg at 03/31/21 2054  •  glucagon (human recombinant) (GLUCAGEN DIAGNOSTIC) injection 1 mg, 1 mg, Subcutaneous, Q15 Min PRN, Shawn Crenshaw MD  •  heparin (porcine) 5000 UNIT/ML injection 5,000 Units, 5,000 Units, Subcutaneous, Q8H, Shawn Crenshaw MD, 5,000 Units at 04/01/21 0528  •  HYDROcodone-acetaminophen (NORCO) 7.5-325 MG per tablet 1 tablet, 1 tablet, Oral, Q4H PRN, Shawn Crenshaw MD, 1 tablet at 03/31/21 2313  •  influenza vac split quad (FLUZONE,FLUARIX,AFLURIA,FLULAVAL) injection 0.5 mL, 0.5 mL, Intramuscular, During Hospitalization, Shawn Crenshaw MD  •  insulin detemir (LEVEMIR) injection 20 Units, 20 Units, Subcutaneous, Nightly, Shawn Crenshaw MD, 20 Units at 03/31/21 2055  •  insulin lispro (humaLOG) injection 0-24 Units, 0-24 Units, Subcutaneous, 4x Daily With Meals & Nightly, Shawn Crenshaw MD, 8 Units at 03/31/21 2053  •  morphine injection 2 mg, 2 mg, Intravenous, Q2H PRN, Shawn Crenshaw MD, Stopped at 03/28/21 0720  •  [DISCONTINUED] fentaNYL citrate (PF) (SUBLIMAZE) injection 25 mcg, 25 mcg, Intravenous, Q30 Min PRN, 25 mcg at 03/24/21 2215 **AND** naloxone (NARCAN) injection 0.4 mg, 0.4 mg, Intravenous, Q5 Min PRN, Shawn Crenshaw MD  •  ondansetron (ZOFRAN) injection 4 mg, 4 mg, Intravenous, Q6H PRN, Shawn Crenshaw MD  •  oxyCODONE-acetaminophen (PERCOCET) 5-325 MG per tablet 2 tablet, 2 tablet, Oral, Q4H PRN, Shawn Crenshaw MD, 2 tablet at 03/26/21 0941  •  pantoprazole (PROTONIX) injection 40 mg, 40 mg, Intravenous, Q AM, Shawn Crenshaw MD, 40 mg at 04/01/21 0528  •  PARoxetine CR (PAXIL-CR) 24 hr  tablet 37.5 mg, 37.5 mg, Oral, Daily, Shawn Crenshaw MD, 37.5 mg at 03/31/21 0839  •  Pharmacy Consult - MT, , Does not apply, Daily, Shawn Crenshaw MD, Stopped at 03/29/21 1025  •  potassium chloride (MICRO-K) CR capsule 40 mEq, 40 mEq, Oral, PRN, 40 mEq at 03/27/21 0826 **OR** potassium chloride (KLOR-CON) packet 40 mEq, 40 mEq, Oral, PRN, Shawn Crenshaw MD, 40 mEq at 03/30/21 0558  •  sodium chloride 0.9 % flush 10 mL, 10 mL, Intravenous, Q12H, Shawn Crenshaw MD, 10 mL at 03/31/21 2055  •  torsemide (DEMADEX) tablet 10 mg, 10 mg, Oral, Daily, LonnieSerge MD, 10 mg at 03/31/21 0838  •  venlafaxine (EFFEXOR) tablet 75 mg, 75 mg, Oral, Daily With Breakfast, Shawn Crenshaw MD, 75 mg at 03/31/21 0838    Physical Exam: General well-developed male sitting in his bed with nasal O2 not dyspneic not tachypneic current heart rate 72        HEENT: No obvious JVP, nasal O2 present, no icterus       Respiratory: Equal bilateral symmetrical expansion with few basilar crackles improved from yesterday       Cardiovascular: Regular rate and rhythm without murmur gallop or click       GI: Obese with positive bowel sounds       Lower Extremities: Has had bilateral BKA's       Neuro: Facial expressions are symmetrical and he is able to roll in the bed with his own strength       Skin: Warm and dry       Psych: Pleasant affect    Results Review: Overnight output exceeds intake by 1.7 L.  He is a net 1.8 L out negative.  Heart rates in the 60s.  Blood pressures 1 22-1 39.  The BMP yesterday showed a creatinine of 1.44.    Radiology Results:  Imaging Results (Last 72 Hours)     Procedure Component Value Units Date/Time    XR Chest 1 View [[id number]] Collected: 03/30/21 0832     Updated: 03/30/21 2115    Narrative:      EXAMINATION: XR CHEST 1 VW- 03/30/2021     INDICATION: Ischemic cardiomyopathy with CHF; Z74.09-Other reduced  mobility; Z74.09-Other reduced mobility; Z78.9-Other specified health  status;  Z74.09-Other reduced mobility     COMPARISON: 03/29/2021     FINDINGS: Sternotomy wires noted. Heart is enlarged. Vasculature is  cephalized. Mild remaining bibasilar atelectasis is improved from the  prior study. No new pulmonary parenchymal disease or evidence of  pneumothorax is seen.       Impression:      Mild pulmonary venous hypertension. Mild, improving  bibasilar atelectasis. No new chest disease.     D:  03/30/2021  E:  03/30/2021         This report was finalized on 3/30/2021 9:12 PM by Dr. Oswaldo Godfrey MD.       XR Chest 1 View [[id number]] Collected: 03/29/21 1015     Updated: 03/29/21 1643    Narrative:      EXAMINATION: XR CHEST 1 VW-03/29/2021:      INDICATION: Shortness of Air; Z74.09-Other reduced mobility;  Z74.09-Other reduced mobility; Z78.9-Other specified health status;  Z74.09-Other reduced mobility.      COMPARISON: Chest x-ray 03/26/2021.     FINDINGS: Interval removal of right internal jugular central venous  catheter sheath and mediastinal/pleural drains from prior comparison  with persistent cardiomegaly. Bibasilar pulmonary opacifications mildly  increased from prior comparison likely atelectatic changes with a  potential trace right pleural effusion noted.       Impression:      Interval removal of right internal jugular central venous  catheter sheath and mediastinal/pleural drains from prior comparison  with persistent cardiomegaly. Bibasilar pulmonary opacifications mildly  increased from prior comparison likely atelectatic changes with a  potential trace right pleural effusion noted.     D:  03/29/2021  E:  03/29/2021     This report was finalized on 3/29/2021 4:39 PM by Dr. Andrew Silverio.             EKG: Sinus rhythm nonspecific ST abnormalities.    ECHO: Follow-up echo shows EF 25% with wall motion abnormalities    Tele: Sinus rhythm with    Assessment   Assessment/Plan: 1 postop atrial fibrillation-the patient is being treated with p.o. amiodarone and is maintaining sinus rhythm  2  ischemic cardiomyopathy with systolic heart failure-the patient had a nice diuretic response yesterday to IV Lasix.  Renal function panel today is pending.  Needs a vasodilator and if renal function stable or improved we will consider Entresto versus BiDil  Patient needs a LifeVest placed    Dustin Ann MD  04/01/21  07:49 EDT

## 2021-04-01 NOTE — NURSING NOTE
Any; news in regards to patient's discharge from our perspective?  Life Vest in place. CM says there's nothing else we can accomplish for patient as he wishes to go home.

## 2021-04-01 NOTE — PROGRESS NOTES
Continued Stay Note   [place]     Patient Name: Dion De La Vega  MRN: [id number]  Today's Date: 4/1/2021    Admit Date: 3/22/2021    Discharge Plan     Row Name 04/01/21 1125       Plan    Plan  Home    Patient/Family in Agreement with Plan  other (see comments)    Plan Comments  Spoke to Viki with [employer] ([phone]) and she will come by today to place lifevest on patient for d/c. Pt refuses rehab and HH. Per his wife, pt does not want to go to outpatient therapy right now and will f/u with his PCP if needed. Pt is currently on oxygen 2L and does not wear at home. CM will follow for d/c needs. Courtney ext. [phone]: Orders noted for oxygen. Called Debra with [employer] and they will deliver a portable along with his wheelchair to patients room today prior to d/c.         Discharge Codes    No documentation.             Courtney Dooley RN

## 2021-04-01 NOTE — NURSING NOTE
Patient to be discharged home today. Has been fitted and is wearing a Life Vest, device is ON. Tele has been SR throughout day. Cooperative although not wanting to participate in any PT as pt states he's unable to put on prosthetic legs secondary to edema. Patient has refused rehab. Blood glucoses are from 146-200 and received sliding scale insulin. WOC and diabetic educator saw pt today. Pt's wounds were changed and dressed per WOC. VSS. Spoke with wife several times as to update patient's status and plan for d/c. Wheelchair has arrived in patient's room. Pt not c/o any pain, in NAD. Verbalizes understanding of POC and frustration from being unable to d/c earlier today. Verbalizes understanding of d/c teaching and follow-ups as instructed per Cardiology and CT surgery services. Mid sternal, s/p CT sites a EVH incisions free from signs of infection. Wife and son to transport patient home--will transfer to their personal vehicle on family's arrival. Pt will return to home. Stable and cooperative at d/c.

## 2021-04-01 NOTE — PLAN OF CARE
Goal Outcome Evaluation:  Plan of Care Reviewed With: patient     Outcome Summary: Pt is A/Ox4, VSS, NSR, pleasant man. C/O pain x 1 last PM, prn meds given. 2-3LNC, CPAP while sleeping. Q2 turn when awake. Dressing changed on coccyx. No events over night. No other complaints.

## 2021-04-01 NOTE — PROGRESS NOTES
[facility] Medicine Services  PROGRESS NOTE    Patient Name: Dion De La Vega  : 1960  MRN: [id number]    Date of Admission: 3/22/2021  Primary Care Physician: Min Aponte DO    Subjective   Subjective     CC:  Follow-up med management    HPI:  Neck events overnight.  Denies any significant chest pain currently, no palpitations.    ROS:  Gen- No fevers, chills  Resp- No cough, dyspnea  GI- No N/V/D, abd pain     Objective   Objective     Vital Signs:   Temp:  [98 °F (36.7 °C)-98.4 °F (36.9 °C)] 98.2 °F (36.8 °C)  Heart Rate:  [61-75] 70  Resp:  [18-20] 20  BP: (115-150)/(72-82) 115/77        Physical Exam:  Constitutional: No acute distress, awake, alert  HENT: NCAT, mucous membranes moist  Respiratory: Clear to auscultation bilaterally, respiratory effort normal    Cardiovascular: RRR, no murmurs  Gastrointestinal: Positive bowel sounds, soft, nontender, nondistended  Musculoskeletal: Bilateral BKA, multiple finger amputations  Psychiatric: Appropriate affect, cooperative  Neurologic: Oriented x 3, Cranial Nerves grossly intact to confrontation, speech clear  Skin: Chest incision is clean, dry, intact      Results Reviewed:  Results from last 7 days   Lab Units 21  0343 21  0318 21  2224   WBC 10*3/mm3 12.12* 12.77* 12.26*   HEMOGLOBIN g/dL 11.0* 10.8* 11.1*   HEMATOCRIT % 33.7* 34.1* 34.9*   PLATELETS 10*3/mm3 295 238 214     Results from last 7 days   Lab Units 21  1053 21  0824 21  1721 21  0343   SODIUM mmol/L 139 138  --  139   POTASSIUM mmol/L 4.3 4.3 4.3 3.5   CHLORIDE mmol/L 105 106  --  106   CO2 mmol/L 25.0 24.0  --  23.0   BUN mg/dL 38* 37*  --  37*   CREATININE mg/dL 1.43* 1.44*  --  1.50*   GLUCOSE mg/dL 173* 142*  --  132*   CALCIUM mg/dL 7.8* 8.0*  --  7.9*   ALT (SGPT) U/L  --   --   --  8   AST (SGOT) U/L  --   --   --  10   PROBNP pg/mL  --   --   --  32,955.0*     Estimated Creatinine Clearance: 67.9 mL/min (A) (by  C-G formula based on SCr of 1.43 mg/dL (H)).    Microbiology Results Abnormal     Procedure Component Value - Date/Time    Blood Culture - Blood, Arm, Left [[id number]] Collected: 03/26/21 1039    Lab Status: Final result Specimen: Blood from Arm, Left Updated: 03/31/21 1115     Blood Culture No growth at 5 days    Blood Culture - Blood, Wrist, Left [[id number]] Collected: 03/26/21 1039    Lab Status: Final result Specimen: Blood from Wrist, Left Updated: 03/31/21 1115     Blood Culture No growth at 5 days    Urine Culture - Urine, Urine, Clean Catch [[id number]]  (Normal) Collected: 03/23/21 0907    Lab Status: Final result Specimen: Urine, Clean Catch Updated: 03/24/21 1046     Urine Culture No growth    COVID PRE-OP / PRE-PROCEDURE SCREENING ORDER (NO ISOLATION) - Swab, Nasopharynx [[id number]]  (Normal) Collected: 03/23/21 1308    Lab Status: Final result Specimen: Swab from Nasopharynx Updated: 03/23/21 1326    Narrative:      The following orders were created for panel order COVID PRE-OP / PRE-PROCEDURE SCREENING ORDER (NO ISOLATION) - Swab, Nasopharynx.  Procedure                               Abnormality         Status                     ---------                               -----------         ------                     COVID-19, ABBOTT IN-HOUS...[[id number]]  Normal              Final result                 Please view results for these tests on the individual orders.    COVID-19, ABBOTT IN-HOUSE,NASAL Swab (NO TRANSPORT MEDIA) 2 HR TAT - Swab, Nasopharynx [[id number]]  (Normal) Collected: 03/23/21 1308    Lab Status: Final result Specimen: Swab from Nasopharynx Updated: 03/23/21 1326     COVID19 Presumptive Negative    Narrative:      Fact sheet for providers: https://www.fda.gov/media/444248/download     Fact sheet for patients: https://www.fda.gov/media/749749/download    Test performed by PCR.  If inconsistent with clinical signs and symptoms patient should be tested with different authorized molecular  test.          Imaging Results (Last 24 Hours)     ** No results found for the last 24 hours. **          Results for orders placed during the hospital encounter of 03/22/21    Adult Transthoracic Echo Limited W/ Cont if Necessary Per Protocol    Interpretation Summary  · There is akinesis of the septal wall. The apex is severely hypokinetic. The left ventricular ejection fraction is 25%      I have reviewed the medications:  Scheduled Meds:amiodarone, 200 mg, Oral, TID AC  [START ON 4/3/2021] amiodarone, 200 mg, Oral, Q12H   Followed by  [START ON 4/17/2021] amiodarone, 200 mg, Oral, Q24H  amoxicillin-clavulanate, 1 tablet, Oral, Q12H  aspirin, 325 mg, Oral, Daily  atorvastatin, 40 mg, Oral, Nightly  carvedilol, 6.25 mg, Oral, BID With Meals  castor oil-balsam peru, , Topical, Q12H  collagenase, , Topical, Q24H  doxycycline, 100 mg, Oral, Q12H  heparin (porcine), 5,000 Units, Subcutaneous, Q8H  insulin detemir, 20 Units, Subcutaneous, Nightly  insulin lispro, 0-24 Units, Subcutaneous, 4x Daily With Meals & Nightly  pantoprazole, 40 mg, Intravenous, Q AM  PARoxetine CR, 37.5 mg, Oral, Daily  pharmacy consult - MTM, , Does not apply, Daily  sodium chloride, 10 mL, Intravenous, Q12H  torsemide, 10 mg, Oral, Daily  venlafaxine, 75 mg, Oral, Daily With Breakfast      Continuous Infusions:   PRN Meds:.calcium gluconate IVPB  •  dextrose  •  dextrose  •  glucagon (human recombinant)  •  HYDROcodone-acetaminophen  •  influenza vaccine  •  Morphine  •  [DISCONTINUED] fentaNYL citrate (PF) **AND** naloxone  •  ondansetron  •  oxyCODONE-acetaminophen  •  potassium chloride **OR** potassium chloride    Assessment/Plan   Assessment & Plan     Active Hospital Problems    Diagnosis  POA   • **Multiple vessel CAD [I25.10]  Yes   • Postoperative PAF (CMS/HCC) [I48.0]  No   • Nocturnal hypoxemia [G47.34]  Yes   • Ischemic cardiomyopathy with LVEF 20-25% [I25.5]  Yes   • S/P CABG x 3 on 3/24/21 [Z95.1]  Not Applicable   • PVD and  bilateral BKA (CMS/[facility]) [I73.9]  Yes   • Hx of amputations of most distal phalanges both hands  [S68.119A]  Yes   • Osteomyelitis R index finger due to dog bite (CMS/[facility]) [M86.9]  Yes   • CHF (CMS/[facility]) [I50.9]  Yes   • Diabetes with HbA1C of 11.0 (CMS/[facility]) [E11.9]  Yes   • Hyperlipemia [E78.5]  Yes      Resolved Hospital Problems   No resolved problems to display.        Brief Hospital Course to date:  Dion De La Vega is a 61 y.o. male with a past medical history uncontrolled type 2 diabetes mellitus, peripheral neuropathy, bilateral BKA, multiple amputations of distal phalanges of both hands, history of right finger osteomyelitis after dog bite, CAD, CHF who was transferred from [facility] on 3/22 for severe three-vessel CAD.  He underwent CABG on 3/24.  Hospital medicine service consulted for medical management.    Uncontrolled type 2 diabetes mellitus  A1c 11.0  -Suspect some noncompliance on patient's part, although he says he takes his insulin on a regular basis  -140 units subcu daily, he received on Levemir 20 units subcu and her blood sugars have been well controlled, on discharge would continue Tresiba 20 units subcu daily  -Continue Victoza  -Follow-up with PCP in 1 week post discharge for diabetes management    LESLEY-Baseline creatinine approximately 1.4 (dated deficient)  follow-up with PCP in 1 week to check BMP    Osteomyelitis of right index finger  -ID following-resume Augmentin and doxycycline    CAD s/p CABG-postop management per CT surgery  Ischemic cardiomyopathy-management per cardiology  Atrial fibrillation-management per cardiology      CODE STATUS:   Code Status and Medical Interventions:   Ordered at: 03/22/21 2118     Code Status:    CPR     Medical Interventions (Level of Support Prior to Arrest):    Full       Tati Daniel MD  04/01/21

## 2021-04-01 NOTE — PROGRESS NOTES
Clinical Nutrition     Reason for Visit: Follow-up protocol    Patient Name: Dion De La Vega  YOB: 1960  MRN: [id number]  Date of Encounter: 03/31/21 22:30 EDT  Admission date: 3/22/2021    Comment: Pt allows doing fairly well w food.    Nutrition Assessment   Problem List:  CAD  NSTEMI  Uncontrolled DM  R. hand infection/ Necrosis  s/p CABG x3 (3-24-21)  Afib    h/o: CHF, ICM, HTN, HLP, DM, R. Hand infection, Osteomyelitis    PMH:   He  has a past medical history of CHF (congestive heart failure) (CMS/[facility]), Diabetes (CMS/[facility]), Hyperlipemia, Hypertension, and Multiple vessel coronary artery disease (3/22/2021).  PSxH:  He  has a past surgical history that includes Below knee leg amputation (Bilateral). multiple digits on hand amputated    Reported/Observed/Food/Nutrition Related History:       From 3/29  Pt sitting up in chair, on nasal cannula, reports he is feeling better, is able to feed himself better with adaptive equipment,  he does not want to try any protein supplements, but is willing to eat greek yogurt, and drink milk with meals    Anthropometrics   Height: 71in  Weight:  258 lb on 3/31 bed scale     IBW w bilat BKA: 148 lb     % %    Labs reviewed   Education r/t Hgb A1C done 3/5 see note  Results from last 7 days   Lab Units 03/31/21  0824 03/30/21  0343   SODIUM mmol/L 138 139   POTASSIUM mmol/L 4.3 3.5   CHLORIDE mmol/L 106 106   CO2 mmol/L 24.0 23.0   BUN mg/dL 37* 37*   CREATININE mg/dL 1.44* 1.50*   CALCIUM mg/dL 8.0* 7.9*   BILIRUBIN mg/dL  --  0.7   ALK PHOS U/L  --  104   ALT (SGPT) U/L  --  8   AST (SGOT) U/L  --  10   GLUCOSE mg/dL 142* 132*       Results from last 7 days   Lab Units 03/31/21 2036 03/31/21  1654 03/31/21  1129 03/31/21  0749 03/30/21 2016 03/30/21  1623   GLUCOSE mg/dL 207* 215* 166* 150* 243* 229*     Lab Results   Lab Value Date/Time    HGBA1C 11.00 (H) 03/22/2021 2302       Medications reviewed   Pertinent:abx, amio,  insulin,  protonix    Intake/Ouptut 24 hrs (7:00AM - 6:59 AM)     Intake & Output (last day)       03/31 0701 - 04/01 0700    P.O. 300    Total Intake(mL/kg) 300 (2.6)    Urine (mL/kg/hr) 2050 (1.1)    Total Output 2050    Net -1750         Urine Unmeasured Occurrence 1 x            Current Nutrition Prescription     PO: Cardiac Consistent Carbohydrate  Supplement: Greek yogurt 3x/da    Intake: 67% x last 6 meals recorded    Nutrition Diagnosis   Date: 3-23-21, 3-25  Problem Food and nutrition knowledge deficit   Etiology Uncontrolled DM   Signs/Symptoms Ha1c = 11   Status: Education done 3-25    Date: 3-23-21, 3-29  Problem Increased nutrient needs: protein   Etiology Poor Skin Integrity   Signs/Symptoms SKIN: sacrum-MASD, R. 1st finger- black necrosis   Status:ongoing    Nutrition Intervention    Follow treatment progress, Care plan reviewed, Advise alternate selection, Menu provided        Goal:   General: Nutrition support treatment  PO: Maintain intake        Monitoring/Evaluation:   Per protocol, PO intake, Supplement intake, Pertinent labs, Weight, Skin status, GI status, Symptoms    Kat Parkinson RD  Time Spent: 20min

## 2021-04-01 NOTE — NURSING NOTE
Wocn follow up for: right hand dressing change and sacral masd follow up    Wound presentation: sacrum - chronic purple discoloration from masd - foam dressing in place removed due to saturation - cleansed with blue wipe and barrier cream applied    Right hand necrosis -  Still same measurements - necrosis looser today and peels back all the way to proximal end, now exposing bone of first finger - no overt signs of infection, MD aware.   wound cleansed with sterile saline, xeroform placed over exposed bone; more crosshatching done on black eschar; santyl applied and saline dampened gauze placed on top of santyl, padded with dry 4x4 and wrappped with kerlix; secured with stockinette    Areas on concern/new issues: under left thumb, patient presents with marroon area from eating utensil assistive strap; assistive device is patients but does not fit; will observe to cesar to see if pressure injury    patient refusing local outpatient therapy stating he will go to [facility] but as of yet no records have been received about this patient and follow up therapy; patient states he has rescheduled his wound care visit but will not give specific date stating his wife made the appointment.     wocn previous assessment that patient didn't need specialist and just needed debridement no longer stands with presence of exposed bone. Although chronic there is not enough good tissue present for a wound vac to be a viable option. Santyl may not even provide results necessary for wound vac/ re-granulation. patient may need plastics or hand specialist/ surgical intervention at some time in future.    Skin interventions in place.    Specialty bed: yes     Wocn will follow. Please contact with questions or concerns.

## 2021-04-01 NOTE — PLAN OF CARE
Problem: Skin Injury Risk Increased  Goal: Skin Health and Integrity  Outcome: Ongoing, Progressing  Intervention: Optimize Skin Protection  Recent Flowsheet Documentation  Taken 4/1/2021 1400 by Caty Chow RN  Pressure Reduction Techniques:   frequent weight shift encouraged   weight shift assistance provided   pressure points protected  Head of Bed (HOB): HOB at 30-45 degrees  Pressure Reduction Devices: pressure-redistributing mattress utilized  Skin Protection:   adhesive use limited   incontinence pads utilized   protective footwear used   tubing/devices free from skin contact  Taken 4/1/2021 1200 by Caty Chow RN  Pressure Reduction Techniques:   frequent weight shift encouraged   weight shift assistance provided   pressure points protected   rest period provided between sit times  Head of Bed (HOB): HOB at 30-45 degrees  Pressure Reduction Devices: pressure-redistributing mattress utilized  Skin Protection:   adhesive use limited   incontinence pads utilized   protective footwear used   tubing/devices free from skin contact  Taken 4/1/2021 1000 by Caty Chow RN  Pressure Reduction Techniques:   frequent weight shift encouraged   pressure points protected   weight shift assistance provided  Pressure Reduction Devices:   positioning supports utilized   pressure-redistributing mattress utilized  Skin Protection:   adhesive use limited   incontinence pads utilized   tubing/devices free from skin contact  Taken 4/1/2021 0800 by Caty Chow, RN  Pressure Reduction Techniques:   frequent weight shift encouraged   weight shift assistance provided   pressure points protected  Head of Bed (HOB): HOB at 30-45 degrees  Pressure Reduction Devices: pressure-redistributing mattress utilized  Skin Protection:   adhesive use limited   incontinence pads utilized   tubing/devices free from skin contact  Goal: Skin Health and Integrity  Outcome: Ongoing, Not Progressing  Intervention: Optimize Skin  Protection  Recent Flowsheet Documentation  Taken 4/1/2021 1400 by Caty Chow RN  Pressure Reduction Techniques:   frequent weight shift encouraged   weight shift assistance provided   pressure points protected  Head of Bed (HOB): HOB at 30-45 degrees  Pressure Reduction Devices: pressure-redistributing mattress utilized  Skin Protection:   adhesive use limited   incontinence pads utilized   protective footwear used   tubing/devices free from skin contact  Taken 4/1/2021 1200 by Caty Chow RN  Pressure Reduction Techniques:   frequent weight shift encouraged   weight shift assistance provided   pressure points protected   rest period provided between sit times  Head of Bed (HOB): HOB at 30-45 degrees  Pressure Reduction Devices: pressure-redistributing mattress utilized  Skin Protection:   adhesive use limited   incontinence pads utilized   protective footwear used   tubing/devices free from skin contact  Taken 4/1/2021 1000 by Caty Chow RN  Pressure Reduction Techniques:   frequent weight shift encouraged   pressure points protected   weight shift assistance provided  Pressure Reduction Devices:   positioning supports utilized   pressure-redistributing mattress utilized  Skin Protection:   adhesive use limited   incontinence pads utilized   tubing/devices free from skin contact  Taken 4/1/2021 0800 by Caty Chow, ESTHER  Pressure Reduction Techniques:   frequent weight shift encouraged   weight shift assistance provided   pressure points protected  Head of Bed (HOB): HOB at 30-45 degrees  Pressure Reduction Devices: pressure-redistributing mattress utilized  Skin Protection:   adhesive use limited   incontinence pads utilized   tubing/devices free from skin contact   Goal Outcome Evaluation:

## 2021-04-01 NOTE — PROGRESS NOTES
Dion De La Vega  1960  [id number]       [facility] Progress Note    Date of Consult: 3/23/21  Requesting Provider: Arjun Kee MD  Evaluating Physician: Kevin Mills MD    Chief Complaint: shortness of breath    Reason for Consultation: concern for osteomyelitis of right hand    History of present illness:    Dion De La Vega is a 61 y.o.  Yr old male with history of poorly controlled DM with peripheral neuropathy, bilateral BKAs, and removal of all distal and most middle phalange's of his hands, mutlivessel CAD, LV dysfunction with ischemic cardiomyopathy, and anterior wall hypokinesis with LVEF 20-25% who was transferred to [facility] yesterday from [facility] for consideration of CABG. He initially presented to OSH on 3/22 after waking up with an anxiety attack and very short of breath. He had elevated troponin level c/w NSTEMI and was started on heparin ggt and transferred to [facility] yesterday for evaluation by CT surgery.     Of note, the patient has recently been dealing with infection of his right hand and has been trated by OSH physician. He was initially started on oral levaquin 3/5 but was switched to orl doxycycline and Augmentin on 3/6 with planned debridement in [place] on 3/29/21.     Since arrival, the patient has remained afebrile and HD stable. He is on 2L NC supplemental O2 with SPO2 90-93%. He has had an elevated troponin level 0.304. Procalcitonin level ws 0.3. Cr was 1.35-->1.44. LFTs were ok. Pro-BNP was elevated to 19,490. WBC is elevated to 12.91. UDS was positive for Benzos only. The patient had urinalysis with 6-12 WBCs, no leuk est, or nitrite. Urine culture has been sent and is pending. The patient is on Augmentin and Doxycycline currently.     Dr. Kee has evaluated the patient given his 3 vessel CAD and is considering CABG although the patient is high risk and given there is concern for active infection of his right hand, ID consultation has been placed.       Of  note, I am currently data deficient with regards to the patient's hand infection. He states that he has been following with hand surgery at [facility] in [place] for this. His dog apparently bit his right index finger about 1 month ago and he had bone infection now s/p amputation of his index finger. Unclear the extent of bone infection. He was briefly on IV antibiotics while inpatient and has most recently been on oral Doxycycline +Augmentin outpatient with plans for debridement on 3/29/21. Unclear of any culture or imaging results at this time. His prior IV antibiotic course is also unclear at this time. He states that he would not be willing to do an outpatient IV antibiotic course so it is unclear to me if he previously refused this.      Subjective:    3/24/21: The patient is still short of breath today. Right hand is about the same. Tolerating abx so far. No n/v, diarrhea, or new rash. No fevers. Plan is for CABG today.    3/25/21: The patient had CABG yesterday. Chest tube in place. On nasal cannula. Tmax 100.3F. significant other at bedside asking about his hand and if I can contact Dr. Post at [facility] in [place]. I explained that hand debridement would likely have to be outpatient by hand surgery after he recovers from his CABG but that I would speak with Dr. Post if I could get in touch with him.    3/26/21: chest tubes and pacer wires removed today. RIJ cordis in place but will likely be removed today per nursing. Not on pressors. Hand is about the same. He did have some fever up to 101.3F. No diarrhea or nausea. No new rashes.    3/27/2021: He has been afebrile over the last 24 hours.  He remains on daptomycin and Zosyn therapy. He denies increased dyspnea. He denies nausea and vomiting. He denies increased chest pain. He thinks that his right hand looks better.    3/28/2021: He has remained afebrile. He is trying to ambulate. He denies increased chest pain. He was on doxycycline  and Augmentin prior to his admission. He complains of some diarrhea.    3/29/21: He has remained afebrile. His creatinine is down to 1.7 this morning.  His white blood cell count is 12.8. Blood cultures from 3/26 remain negative.  He denies increased hand pain.    3/30/21: He has been moved out of the ICU.  He remains afebrile.  He denies increased hand pain.  He thinks that he will be discharged tomorrow.    3/31/21: He has remained afebrile. He would like to be discharged.  He denies hand pain. He underwent partial debridement of his right hand slough today by wound care.    4/1/21: the patient is doing well today. No fevers. Right hand is about the same. Some of the necrotic tissue has been debrided by wound care and there is exposed bone below the necrotic tissue but no surrounding redness or significant drainage. He is tolerating the oral abx well without ADRs. Breathing has improved.     Past Medical History:   Diagnosis Date   • CHF (congestive heart failure) (CMS/HCC)    • Diabetes (CMS/HCC)    • Hyperlipemia    • Hypertension    • Multiple vessel coronary artery disease 3/22/2021    Added automatically from request for surgery [id number]       Past Surgical History:   Procedure Laterality Date   • BELOW KNEE LEG AMPUTATION Bilateral        Social History:  Never smoker. No ETOH or IVDU.    Family History:  Negative    No Known Allergies    Medication:  Current Facility-Administered Medications   Medication Dose Route Frequency Provider Last Rate Last Admin   • amiodarone (PACERONE) tablet 200 mg  200 mg Oral TID AC Shawn Crenshaw MD   200 mg at 04/01/21 1704   • [START ON 4/3/2021] amiodarone (PACERONE) tablet 200 mg  200 mg Oral Q12H Shawn Crenshaw MD        Followed by   • [START ON 4/17/2021] amiodarone (PACERONE) tablet 200 mg  200 mg Oral Q24H Shawn Crenshaw MD       • amoxicillin-clavulanate (AUGMENTIN) 875-125 MG per tablet 1 tablet  1 tablet Oral Q12H Shawn Crenshaw MD   1 tablet at  04/01/21 0842   • aspirin EC tablet 325 mg  325 mg Oral Daily Shawn Crenshaw MD   325 mg at 04/01/21 0841   • atorvastatin (LIPITOR) tablet 40 mg  40 mg Oral Nightly Shawn Crenshaw MD   40 mg at 03/31/21 2053   • calcium gluconate 2 g in sodium chloride 0.9 % 100 mL IVPB  2 g Intravenous Once PRN Shawn Crenshaw MD       • carvedilol (COREG) tablet 6.25 mg  6.25 mg Oral BID With Meals Shawn Crenshaw MD   6.25 mg at 04/01/21 1705   • castor oil-balsam peru (VENELEX) ointment   Topical Q12H Shawn Crenshaw MD   Given at 04/01/21 0843   • collagenase ointment   Topical Q24H Shawn Crenshaw MD   Given at 04/01/21 0843   • dextrose (D50W) 25 g/ 50mL Intravenous Solution 25 g  25 g Intravenous Q15 Min PRN Shawn Crenshaw MD       • dextrose (GLUTOSE) oral gel 15 g  15 g Oral Q15 Min PRN Shawn Crenshaw MD       • doxycycline (MONODOX) capsule 100 mg  100 mg Oral Q12H Shawn Crenshaw MD   100 mg at 04/01/21 0841   • glucagon (human recombinant) (GLUCAGEN DIAGNOSTIC) injection 1 mg  1 mg Subcutaneous Q15 Min PRN Shawn Crenshaw MD       • heparin (porcine) 5000 UNIT/ML injection 5,000 Units  5,000 Units Subcutaneous Q8H Shawn Crenshaw MD   5,000 Units at 04/01/21 1335   • HYDROcodone-acetaminophen (NORCO) 7.5-325 MG per tablet 1 tablet  1 tablet Oral Q4H PRN Shawn Crenshaw MD   1 tablet at 03/31/21 2313   • influenza vac split quad (FLUZONE,FLUARIX,AFLURIA,FLULAVAL) injection 0.5 mL  0.5 mL Intramuscular During Hospitalization Shawn Crenshaw MD       • insulin detemir (LEVEMIR) injection 20 Units  20 Units Subcutaneous Nightly Shawn Crenshaw MD   20 Units at 03/31/21 2055   • insulin lispro (humaLOG) injection 0-24 Units  0-24 Units Subcutaneous 4x Daily With Meals & Nightly Shawn Crenshaw MD   8 Units at 04/01/21 1702   • morphine injection 2 mg  2 mg Intravenous Q2H PRN Shawn Crenshaw MD   Stopped at 03/28/21 0720   • naloxone (NARCAN) injection 0.4 mg  0.4 mg Intravenous Q5 Min  PRN Shawn Crenshaw MD       • ondansetron (ZOFRAN) injection 4 mg  4 mg Intravenous Q6H PRN Shawn Crenshaw MD       • oxyCODONE-acetaminophen (PERCOCET) 5-325 MG per tablet 2 tablet  2 tablet Oral Q4H PRN Shawn Crenshaw MD   2 tablet at 03/26/21 0941   • [START ON 4/2/2021] pantoprazole (PROTONIX) EC tablet 40 mg  40 mg Oral Q AM Kaylin Leung, PharmD       • PARoxetine CR (PAXIL-CR) 24 hr tablet 37.5 mg  37.5 mg Oral Daily Shawn Crenshaw MD   37.5 mg at 04/01/21 0844   • Pharmacy Consult - MTM   Does not apply Daily Shawn Crenshaw MD   Stopped at 03/29/21 1025   • potassium chloride (MICRO-K) CR capsule 40 mEq  40 mEq Oral PRN Shawn Crenshaw MD   40 mEq at 03/27/21 0826    Or   • potassium chloride (KLOR-CON) packet 40 mEq  40 mEq Oral PRN Shawn Crenshaw MD   40 mEq at 03/30/21 0558   • sodium chloride 0.9 % flush 10 mL  10 mL Intravenous Q12H Shawn Crenshaw MD   10 mL at 04/01/21 0840   • torsemide (DEMADEX) tablet 10 mg  10 mg Oral Daily Serge Fleming MD   10 mg at 04/01/21 0841   • venlafaxine (EFFEXOR) tablet 75 mg  75 mg Oral Daily With Breakfast Shawn Crenshaw MD   75 mg at 04/01/21 0841       Antibiotics:  Anti-Infectives (From admission, onward)    Ordered     Dose/Rate Route Frequency Start Stop    04/01/21 1725  amoxicillin-clavulanate (AUGMENTIN) 875-125 MG per tablet     Ordering Provider: Tati Daniel MD    1 tablet Oral Every 12 Hours Scheduled 04/01/21 0000 04/11/21 2359    04/01/21 1725  doxycycline (MONODOX) 100 MG capsule     Ordering Provider: Tati Daniel MD    100 mg Oral Every 12 Hours Scheduled 04/01/21 0000 04/11/21 2359    03/28/21 1141  doxycycline (MONODOX) capsule 100 mg     Hector Walters, PharmD reviewed the order on 03/29/21 0945.   Ordering Provider: Shawn Crenshaw MD    100 mg Oral Every 12 Hours Scheduled 03/28/21 1230 04/11/21 0859    03/28/21 1141  amoxicillin-clavulanate (AUGMENTIN) 875-125 MG per tablet 1 tablet     Hector Walters  "A, PharmD reviewed the order on 03/29/21 0945.   Ordering Provider: Shawn Crenshaw MD    1 tablet Oral Every 12 Hours Scheduled 03/28/21 1230 04/11/21 0859    03/25/21 0818  piperacillin-tazobactam (ZOSYN) 4.5 g in iso-osmotic dextrose 100 mL IVPB (premix)     Ordering Provider: Kevin Mills MD    4.5 g  over 30 Minutes Intravenous Once 03/25/21 0915 03/25/21 1059    03/24/21 1723  cefuroxime (ZINACEF) 1.5 g/100 mL 0.9% NS IVPB (mbp)     Ordering Provider: Garcia Warren PA    1.5 g  over 30 Minutes Intravenous Every 8 Hours 03/24/21 2000 03/26/21 0459    03/24/21 0741  cefuroxime (ZINACEF) 1.5 g/100 mL 0.9% NS IVPB (mbp)     Ordering Provider: Garcia Warren PA    1.5 g  over 30 Minutes Intravenous Once 03/24/21 0830 03/24/21 1310            Review of Systems    As above    Physical Exam:   Vital Signs   /78 (BP Location: Left arm, Patient Position: Lying)   Pulse 75   Temp 98.8 °F (37.1 °C) (Axillary)   Resp 20   Ht 180.3 cm (70.98\")   Wt 114 kg (251 lb 4.8 oz)   SpO2 96%   BMI 35.07 kg/m²     GENERAL: Awake and alert, in no acute distress. Obese. Sitting up in bed  HENT: Normocephalic, atraumatic. No external oral lesions  Eyes:   No conjunctival injection. No icterus.   Chest: sternotomy site with dressing in place, no surrounding erythema  HEART: RRR; No murmur,    LUNGS: CTAB. Normal respiratory effort on 2L NC  ABDOMEN: Soft, nontender, nondistended. No rebound or guarding.   EXT:  No cyanosis,   : Without Morrow catheter.  MSK: s/p bilateral BKA- stump sites are ok. S/p multiple finger amputations bilaterally.    There is decreased right mid hand slough/necrosis- does have some exposed bone under necrotic tissue  SKIN: no generalized rashes noted  NEURO: Oriented to PPT. No focal deficits on motor/sensory exam at arms/legs.  PSYCHIATRIC: Normal insight and judgement. Cooperative with PE      Laboratory Data    Results from last 7 days   Lab Units 03/30/21  0343 03/29/21  0318 " 03/28/21  2224   WBC 10*3/mm3 12.12* 12.77* 12.26*   HEMOGLOBIN g/dL 11.0* 10.8* 11.1*   HEMATOCRIT % 33.7* 34.1* 34.9*   PLATELETS 10*3/mm3 295 238 214     Results from last 7 days   Lab Units 04/01/21  1053   SODIUM mmol/L 139   POTASSIUM mmol/L 4.3   CHLORIDE mmol/L 105   CO2 mmol/L 25.0   BUN mg/dL 38*   CREATININE mg/dL 1.43*   GLUCOSE mg/dL 173*   CALCIUM mg/dL 7.8*     Results from last 7 days   Lab Units 03/30/21  0343   ALK PHOS U/L 104   BILIRUBIN mg/dL 0.7   ALT (SGPT) U/L 8   AST (SGOT) U/L 10               Estimated Creatinine Clearance: 67.9 mL/min (A) (by C-G formula based on SCr of 1.43 mg/dL (H)).       Microbiology:  COVID-19 PCR: not detected    Urine cx: in process    Radiology:  XR Chest 1 View    Result Date: 3/30/2021  Mild pulmonary venous hypertension. Mild, improving bibasilar atelectasis. No new chest disease.  D:  03/30/2021 E:  03/30/2021    This report was finalized on 3/30/2021 9:12 PM by Dr. Oswaldo Godfrey MD.      XR Chest 1 View    Result Date: 3/29/2021  Interval removal of right internal jugular central venous catheter sheath and mediastinal/pleural drains from prior comparison with persistent cardiomegaly. Bibasilar pulmonary opacifications mildly increased from prior comparison likely atelectatic changes with a potential trace right pleural effusion noted.  D:  03/29/2021 E:  03/29/2021  This report was finalized on 3/29/2021 4:39 PM by Dr. Andrew Silverio.      XR Chest 1 View    Result Date: 3/26/2021  No new chest disease.   D:  03/26/2021 E:  03/26/2021  This report was finalized on 3/26/2021 10:58 PM by Dr. Oswaldo Godfrey MD.      XR Chest 1 View    Result Date: 3/25/2021  Post extubation chest radiograph with no new chest disease.  D:  03/25/2021 E:  03/25/2021    This report was finalized on 3/25/2021 10:16 PM by Dr. Oswaldo Godfrey MD.      XR Chest 1 View    Result Date: 3/24/2021  Median sternotomy wires are intact. Endotracheal tube in good position above the romeo. Nasogastric tube  in the stomach. Mediastinal and left pleural drains are in place. Pulmonary artery catheter in good position. No focal airspace opacity. No significant effusion or distinct pneumothorax. Unchanged heart and mediastinal contours.  This report was finalized on 3/24/2021 6:15 PM by Taqueria Henao.      XR Abdomen KUB    Result Date: 3/24/2021  Nasogastric tube appears to terminate in the stomach.  This report was finalized on 3/24/2021 6:17 PM by Taqueria Henao.          Impression:  1. Right hand infection/amputation site necrosis/osteomyelitis-he still has substantial necrotic tissue present but no progressive cellulitis. This area will need to be debrided again in [place]. I will continue him on doxycycline and Augmentin.  2. Coronary artery disease-status post non-STEMI followed by CABG-3/24/2021  3. Postoperative fevers-improved  4. Ischemic cardiomyopathy  5. Status post bilateral BKA  6. Type 2 diabetes mellitus-poorly controlled      Recommendations:   1. Continue doxycycline  2. Continue Augmentin  3. Repeat hand debridement at [facility] in [place] in the near future  4.  Santyl dressings to the right hand wound  5.  Follow up with hand surgery in [place]    I am ok with his discharge on oral antibiotics today. Has a supply of Augmentin and Doxycycline at home that was provided by his hand surgeon in [place]. He will need to follow up with surgery in [place] who was managing his hand infection/wound prior to his arrival for CABG.    Kevin Mills MD  4/1/2021

## 2021-04-02 ENCOUNTER — READMISSION MANAGEMENT (OUTPATIENT)
Dept: CALL CENTER | Facility: HOSPITAL | Age: 61
End: 2021-04-02

## 2021-04-02 NOTE — PAYOR COMM NOTE
"Andreea Portillo RN  Utilization Review  P: [phone]  F: [phone]    Ref # [id number]  DC date = 4/1/2021    Ragini De La Vega (61 y.o. Male)     Date of Birth Social Security Number Address Home Phone MRN    1960  [address] [facility] [phone] [id number]    Sikh Marital Status          Scientologist        Admission Date Admission Type Admitting Provider Attending Provider Department, Room/Bed    3/22/21 Urgent Suhas Nicole MD  [facility] 4H, S471/1    Discharge Date Discharge Disposition Discharge Destination        4/1/2021 Home or Self Care              Attending Provider: (none)   Allergies: No Known Allergies    Isolation: None   Infection: None   Code Status: Prior    Ht: 180.3 cm (70.98\")   Wt: 114 kg (251 lb 4.8 oz)    Admission Cmt: None   Principal Problem: Multiple vessel CAD [I25.10] More...                 Active Insurance as of 3/22/2021     Primary Coverage     Payor Plan Insurance Group Employer/Plan Group    AETNA MEDICARE REPLACEMENT AETNA MEDICARE REPLACEMENT [id number]     Payor Plan Address Payor Plan Phone Number Payor Plan Fax Number Effective Dates    [address] [phone]  1/1/2021 - None Entered    [facility] [id number]       Subscriber Name Subscriber Birth Date Member ID       RAGINI DE LA VEGA 1960 [id number]                 Emergency Contacts      (Rel.) Home Phone Work Phone Mobile Phone    Violetta De La Vega (Spouse) -- -- [phone]               Physician Progress Notes (most recent note)      Kevin Mills MD at 04/01/21 1751          Ragini De La Vega  1960  [id number]       [facility] Progress Note    Date of Consult: 3/23/21  Requesting Provider: Arjun Kee MD  Evaluating Physician: Kevin Mills MD    Chief Complaint: shortness of breath    Reason for Consultation: concern for osteomyelitis of right hand    History of present illness:    Ragini De La Vega is a 61 y.o.  Yr old " male with history of poorly controlled DM with peripheral neuropathy, bilateral BKAs, and removal of all distal and most middle phalange's of his hands, mutlivessel CAD, LV dysfunction with ischemic cardiomyopathy, and anterior wall hypokinesis with LVEF 20-25% who was transferred to [facility] yesterday from [facility] for consideration of CABG. He initially presented to OSH on 3/22 after waking up with an anxiety attack and very short of breath. He had elevated troponin level c/w NSTEMI and was started on heparin ggt and transferred to [facility] yesterday for evaluation by CT surgery.     Of note, the patient has recently been dealing with infection of his right hand and has been trated by OSH physician. He was initially started on oral levaquin 3/5 but was switched to orl doxycycline and Augmentin on 3/6 with planned debridement in [place] on 3/29/21.     Since arrival, the patient has remained afebrile and HD stable. He is on 2L NC supplemental O2 with SPO2 90-93%. He has had an elevated troponin level 0.304. Procalcitonin level ws 0.3. Cr was 1.35-->1.44. LFTs were ok. Pro-BNP was elevated to 19,490. WBC is elevated to 12.91. UDS was positive for Benzos only. The patient had urinalysis with 6-12 WBCs, no leuk est, or nitrite. Urine culture has been sent and is pending. The patient is on Augmentin and Doxycycline currently.     Dr. Kee has evaluated the patient given his 3 vessel CAD and is considering CABG although the patient is high risk and given there is concern for active infection of his right hand, ID consultation has been placed.       Of note, I am currently data deficient with regards to the patient's hand infection. He states that he has been following with hand surgery at [facility] in [place] for this. His dog apparently bit his right index finger about 1 month ago and he had bone infection now s/p amputation of his index finger. Unclear the extent of bone infection. He was  briefly on IV antibiotics while inpatient and has most recently been on oral Doxycycline +Augmentin outpatient with plans for debridement on 3/29/21. Unclear of any culture or imaging results at this time. His prior IV antibiotic course is also unclear at this time. He states that he would not be willing to do an outpatient IV antibiotic course so it is unclear to me if he previously refused this.      Subjective:    3/24/21: The patient is still short of breath today. Right hand is about the same. Tolerating abx so far. No n/v, diarrhea, or new rash. No fevers. Plan is for CABG today.    3/25/21: The patient had CABG yesterday. Chest tube in place. On nasal cannula. Tmax 100.3F. significant other at bedside asking about his hand and if I can contact Dr. Post at [facility] in [place]. I explained that hand debridement would likely have to be outpatient by hand surgery after he recovers from his CABG but that I would speak with Dr. Post if I could get in touch with him.    3/26/21: chest tubes and pacer wires removed today. RIJ cordis in place but will likely be removed today per nursing. Not on pressors. Hand is about the same. He did have some fever up to 101.3F. No diarrhea or nausea. No new rashes.    3/27/2021: He has been afebrile over the last 24 hours.  He remains on daptomycin and Zosyn therapy. He denies increased dyspnea. He denies nausea and vomiting. He denies increased chest pain. He thinks that his right hand looks better.    3/28/2021: He has remained afebrile. He is trying to ambulate. He denies increased chest pain. He was on doxycycline and Augmentin prior to his admission. He complains of some diarrhea.    3/29/21: He has remained afebrile. His creatinine is down to 1.7 this morning.  His white blood cell count is 12.8. Blood cultures from 3/26 remain negative.  He denies increased hand pain.    3/30/21: He has been moved out of the ICU.  He remains afebrile.  He denies increased hand  pain.  He thinks that he will be discharged tomorrow.    3/31/21: He has remained afebrile. He would like to be discharged.  He denies hand pain. He underwent partial debridement of his right hand slough today by wound care.    4/1/21: the patient is doing well today. No fevers. Right hand is about the same. Some of the necrotic tissue has been debrided by wound care and there is exposed bone below the necrotic tissue but no surrounding redness or significant drainage. He is tolerating the oral abx well without ADRs. Breathing has improved.     Past Medical History:   Diagnosis Date   • CHF (congestive heart failure) (CMS/[facility])    • Diabetes (CMS/HCC)    • Hyperlipemia    • Hypertension    • Multiple vessel coronary artery disease 3/22/2021    Added automatically from request for surgery [id number]       Past Surgical History:   Procedure Laterality Date   • BELOW KNEE LEG AMPUTATION Bilateral        Social History:  Never smoker. No ETOH or IVDU.    Family History:  Negative    No Known Allergies    Medication:  Current Facility-Administered Medications   Medication Dose Route Frequency Provider Last Rate Last Admin   • amiodarone (PACERONE) tablet 200 mg  200 mg Oral TID AC Shawn Crenshaw MD   200 mg at 04/01/21 1704   • [START ON 4/3/2021] amiodarone (PACERONE) tablet 200 mg  200 mg Oral Q12H Shawn Crenshaw MD        Followed by   • [START ON 4/17/2021] amiodarone (PACERONE) tablet 200 mg  200 mg Oral Q24H Shawn Crenshaw MD       • amoxicillin-clavulanate (AUGMENTIN) 875-125 MG per tablet 1 tablet  1 tablet Oral Q12H Shawn Crenshaw MD   1 tablet at 04/01/21 0842   • aspirin EC tablet 325 mg  325 mg Oral Daily Shawn Crenshaw MD   325 mg at 04/01/21 0841   • atorvastatin (LIPITOR) tablet 40 mg  40 mg Oral Nightly Shawn Crenshaw MD   40 mg at 03/31/21 2053   • calcium gluconate 2 g in sodium chloride 0.9 % 100 mL IVPB  2 g Intravenous Once PRN Shawn Crenshaw MD       • carvedilol (COREG) tablet 6.25  mg  6.25 mg Oral BID With Meals Shawn Crenshaw MD   6.25 mg at 04/01/21 1705   • castor oil-balsam peru (VENELEX) ointment   Topical Q12H Shawn Crenshaw MD   Given at 04/01/21 0843   • collagenase ointment   Topical Q24H Shawn Crenshaw MD   Given at 04/01/21 0843   • dextrose (D50W) 25 g/ 50mL Intravenous Solution 25 g  25 g Intravenous Q15 Min PRN Shawn Crenshaw MD       • dextrose (GLUTOSE) oral gel 15 g  15 g Oral Q15 Min PRN Shawn Crenshaw MD       • doxycycline (MONODOX) capsule 100 mg  100 mg Oral Q12H Shawn Crenshaw MD   100 mg at 04/01/21 0841   • glucagon (human recombinant) (GLUCAGEN DIAGNOSTIC) injection 1 mg  1 mg Subcutaneous Q15 Min PRN Shawn Crenshaw MD       • heparin (porcine) 5000 UNIT/ML injection 5,000 Units  5,000 Units Subcutaneous Q8H Shawn Crenshaw MD   5,000 Units at 04/01/21 1335   • HYDROcodone-acetaminophen (NORCO) 7.5-325 MG per tablet 1 tablet  1 tablet Oral Q4H PRN Shawn Crenshaw MD   1 tablet at 03/31/21 2313   • influenza vac split quad (FLUZONE,FLUARIX,AFLURIA,FLULAVAL) injection 0.5 mL  0.5 mL Intramuscular During Hospitalization Shawn Crenshaw MD       • insulin detemir (LEVEMIR) injection 20 Units  20 Units Subcutaneous Nightly Shawn Crenshaw MD   20 Units at 03/31/21 2055   • insulin lispro (humaLOG) injection 0-24 Units  0-24 Units Subcutaneous 4x Daily With Meals & Nightly Shawn Crenshaw MD   8 Units at 04/01/21 1702   • morphine injection 2 mg  2 mg Intravenous Q2H PRN Shawn Crenshaw MD   Stopped at 03/28/21 0720   • naloxone (NARCAN) injection 0.4 mg  0.4 mg Intravenous Q5 Min PRN Shawn Crenshaw MD       • ondansetron (ZOFRAN) injection 4 mg  4 mg Intravenous Q6H PRN Shawn Crenshaw MD       • oxyCODONE-acetaminophen (PERCOCET) 5-325 MG per tablet 2 tablet  2 tablet Oral Q4H PRN Shawn Crenshaw MD   2 tablet at 03/26/21 0941   • [START ON 4/2/2021] pantoprazole (PROTONIX) EC tablet 40 mg  40 mg Oral Q AM Kaylin Leung, PharmD        • PARoxetine CR (PAXIL-CR) 24 hr tablet 37.5 mg  37.5 mg Oral Daily Shawn Crenshaw MD   37.5 mg at 04/01/21 0844   • Pharmacy Consult - MTM   Does not apply Daily Shawn Crenshaw MD   Stopped at 03/29/21 1025   • potassium chloride (MICRO-K) CR capsule 40 mEq  40 mEq Oral PRN Shawn Crenshaw MD   40 mEq at 03/27/21 0826    Or   • potassium chloride (KLOR-CON) packet 40 mEq  40 mEq Oral PRN Shawn Crenshaw MD   40 mEq at 03/30/21 0558   • sodium chloride 0.9 % flush 10 mL  10 mL Intravenous Q12H Shawn Crenshaw MD   10 mL at 04/01/21 0840   • torsemide (DEMADEX) tablet 10 mg  10 mg Oral Daily Serge Fleming MD   10 mg at 04/01/21 0841   • venlafaxine (EFFEXOR) tablet 75 mg  75 mg Oral Daily With Breakfast Shawn Crenshaw MD   75 mg at 04/01/21 0841       Antibiotics:  Anti-Infectives (From admission, onward)    Ordered     Dose/Rate Route Frequency Start Stop    04/01/21 1725  amoxicillin-clavulanate (AUGMENTIN) 875-125 MG per tablet     Ordering Provider: Tati Daniel MD    1 tablet Oral Every 12 Hours Scheduled 04/01/21 0000 04/11/21 2359    04/01/21 1725  doxycycline (MONODOX) 100 MG capsule     Ordering Provider: Tati Daniel MD    100 mg Oral Every 12 Hours Scheduled 04/01/21 0000 04/11/21 2359    03/28/21 1141  doxycycline (MONODOX) capsule 100 mg     Hector Walters, PharmD reviewed the order on 03/29/21 0945.   Ordering Provider: Shawn Crenshaw MD    100 mg Oral Every 12 Hours Scheduled 03/28/21 1230 04/11/21 0859    03/28/21 1141  amoxicillin-clavulanate (AUGMENTIN) 875-125 MG per tablet 1 tablet     Hector Walters, PharmD reviewed the order on 03/29/21 0945.   Ordering Provider: Shawn Crenshaw MD    1 tablet Oral Every 12 Hours Scheduled 03/28/21 1230 04/11/21 0859    03/25/21 0818  piperacillin-tazobactam (ZOSYN) 4.5 g in iso-osmotic dextrose 100 mL IVPB (premix)     Ordering Provider: Kevin Mills MD    4.5 g  over 30 Minutes Intravenous Once 03/25/21 0915  "03/25/21 1059    03/24/21 1723  cefuroxime (ZINACEF) 1.5 g/100 mL 0.9% NS IVPB (mbp)     Ordering Provider: Garcia Warren PA    1.5 g  over 30 Minutes Intravenous Every 8 Hours 03/24/21 2000 03/26/21 0459    03/24/21 0741  cefuroxime (ZINACEF) 1.5 g/100 mL 0.9% NS IVPB (mbp)     Ordering Provider: Garcia Warren PA    1.5 g  over 30 Minutes Intravenous Once 03/24/21 0830 03/24/21 1310            Review of Systems    As above    Physical Exam:   Vital Signs   /78 (BP Location: Left arm, Patient Position: Lying)   Pulse 75   Temp 98.8 °F (37.1 °C) (Axillary)   Resp 20   Ht 180.3 cm (70.98\")   Wt 114 kg (251 lb 4.8 oz)   SpO2 96%   BMI 35.07 kg/m²     GENERAL: Awake and alert, in no acute distress. Obese. Sitting up in bed  HENT: Normocephalic, atraumatic. No external oral lesions  Eyes:   No conjunctival injection. No icterus.   Chest: sternotomy site with dressing in place, no surrounding erythema  HEART: RRR; No murmur,    LUNGS: CTAB. Normal respiratory effort on 2L NC  ABDOMEN: Soft, nontender, nondistended. No rebound or guarding.   EXT:  No cyanosis,   : Without Morrow catheter.  MSK: s/p bilateral BKA- stump sites are ok. S/p multiple finger amputations bilaterally.    There is decreased right mid hand slough/necrosis- does have some exposed bone under necrotic tissue  SKIN: no generalized rashes noted  NEURO: Oriented to PPT. No focal deficits on motor/sensory exam at arms/legs.  PSYCHIATRIC: Normal insight and judgement. Cooperative with PE      Laboratory Data    Results from last 7 days   Lab Units 03/30/21  0343 03/29/21  0318 03/28/21  2224   WBC 10*3/mm3 12.12* 12.77* 12.26*   HEMOGLOBIN g/dL 11.0* 10.8* 11.1*   HEMATOCRIT % 33.7* 34.1* 34.9*   PLATELETS 10*3/mm3 295 238 214     Results from last 7 days   Lab Units 04/01/21  1053   SODIUM mmol/L 139   POTASSIUM mmol/L 4.3   CHLORIDE mmol/L 105   CO2 mmol/L 25.0   BUN mg/dL 38*   CREATININE mg/dL 1.43*   GLUCOSE mg/dL 173*   CALCIUM " mg/dL 7.8*     Results from last 7 days   Lab Units 03/30/21  0343   ALK PHOS U/L 104   BILIRUBIN mg/dL 0.7   ALT (SGPT) U/L 8   AST (SGOT) U/L 10               Estimated Creatinine Clearance: 67.9 mL/min (A) (by C-G formula based on SCr of 1.43 mg/dL (H)).       Microbiology:  COVID-19 PCR: not detected    Urine cx: in process    Radiology:  XR Chest 1 View    Result Date: 3/30/2021  Mild pulmonary venous hypertension. Mild, improving bibasilar atelectasis. No new chest disease.  D:  03/30/2021 E:  03/30/2021    This report was finalized on 3/30/2021 9:12 PM by Dr. Oswaldo Godfrey MD.      XR Chest 1 View    Result Date: 3/29/2021  Interval removal of right internal jugular central venous catheter sheath and mediastinal/pleural drains from prior comparison with persistent cardiomegaly. Bibasilar pulmonary opacifications mildly increased from prior comparison likely atelectatic changes with a potential trace right pleural effusion noted.  D:  03/29/2021 E:  03/29/2021  This report was finalized on 3/29/2021 4:39 PM by Dr. Andrew Silverio.      XR Chest 1 View    Result Date: 3/26/2021  No new chest disease.   D:  03/26/2021 E:  03/26/2021  This report was finalized on 3/26/2021 10:58 PM by Dr. Oswaldo Godfrey MD.      XR Chest 1 View    Result Date: 3/25/2021  Post extubation chest radiograph with no new chest disease.  D:  03/25/2021 E:  03/25/2021    This report was finalized on 3/25/2021 10:16 PM by Dr. Oswaldo Godfrey MD.      XR Chest 1 View    Result Date: 3/24/2021  Median sternotomy wires are intact. Endotracheal tube in good position above the romeo. Nasogastric tube in the stomach. Mediastinal and left pleural drains are in place. Pulmonary artery catheter in good position. No focal airspace opacity. No significant effusion or distinct pneumothorax. Unchanged heart and mediastinal contours.  This report was finalized on 3/24/2021 6:15 PM by Taqueria Henao.      XR Abdomen KUB    Result Date: 3/24/2021  Nasogastric tube  appears to terminate in the stomach.  This report was finalized on 3/24/2021 6:17 PM by Taqueria Henao.          Impression:  1. Right hand infection/amputation site necrosis/osteomyelitis-he still has substantial necrotic tissue present but no progressive cellulitis. This area will need to be debrided again in [place]. I will continue him on doxycycline and Augmentin.  2. Coronary artery disease-status post non-STEMI followed by CABG-3/24/2021  3. Postoperative fevers-improved  4. Ischemic cardiomyopathy  5. Status post bilateral BKA  6. Type 2 diabetes mellitus-poorly controlled      Recommendations:   1. Continue doxycycline  2. Continue Augmentin  3. Repeat hand debridement at [facility] in [place] in the near future  4.  Santyl dressings to the right hand wound  5.  Follow up with hand surgery in [place]    I am ok with his discharge on oral antibiotics today. Has a supply of Augmentin and Doxycycline at home that was provided by his hand surgeon in [place]. He will need to follow up with surgery in [place] who was managing his hand infection/wound prior to his arrival for CABG.    Kevin Mills MD  4/1/2021                  Electronically signed by Kevin Mills MD at 04/01/21 4915       Consult Notes (last 24 hours) (Notes from 04/01/21 0714 through 04/02/21 0714)    No notes of this type exist for this encounter.         Discharge Summary    No notes of this type exist for this encounter.

## 2021-04-05 RX ORDER — AMIODARONE HYDROCHLORIDE 200 MG/1
200 TABLET ORAL EVERY 12 HOURS SCHEDULED
Qty: 28 TABLET | Refills: 0 | OUTPATIENT
Start: 2021-04-05 | End: 2021-04-19

## 2021-04-06 ENCOUNTER — READMISSION MANAGEMENT (OUTPATIENT)
Dept: CALL CENTER | Facility: HOSPITAL | Age: 61
End: 2021-04-06

## 2021-04-06 NOTE — OUTREACH NOTE
CT Surgery Week 1 Survey      Responses   [facility] patient discharged from?  [place]   Does the patient have one of the following disease processes/diagnoses(primary or secondary)?  Cardiothoracic surgery   Week 1 attempt successful?  No   Unsuccessful attempts  Attempt 1            Irene Mcdowell RN

## 2021-04-08 ENCOUNTER — READMISSION MANAGEMENT (OUTPATIENT)
Dept: CALL CENTER | Facility: HOSPITAL | Age: 61
End: 2021-04-08

## 2021-04-08 NOTE — OUTREACH NOTE
CT Surgery Week 1 Survey      Responses   [facility] patient discharged from?  [place]   Does the patient have one of the following disease processes/diagnoses(primary or secondary)?  Cardiothoracic surgery   Week 1 attempt successful?  No   Unsuccessful attempts  Attempt 2            Quin Robb RN

## 2021-04-09 ENCOUNTER — READMISSION MANAGEMENT (OUTPATIENT)
Dept: CALL CENTER | Facility: HOSPITAL | Age: 61
End: 2021-04-09

## 2021-04-09 NOTE — OUTREACH NOTE
CT Surgery Week 1 Survey      Responses   [facility] patient discharged from?  [place]   Does the patient have one of the following disease processes/diagnoses(primary or secondary)?  Cardiothoracic surgery   Week 1 attempt successful?  No   Unsuccessful attempts  Attempt 3            Hilda Cardoza RN

## 2021-04-12 NOTE — DISCHARGE SUMMARY
CTS Discharge Summary    Patient Care Team:  Min Aponte DO as PCP - General (Internal Medicine)  Consults:   Consults     Date and Time Order Name Status Description    3/22/2021  9:40 PM Inpatient Infectious Diseases Consult Completed           Date of Admission: 3/22/2021  9:36 PM  Date of Discharge:  4/1/2021    Discharge Diagnosis  Past Medical History:   Diagnosis Date   • CHF (congestive heart failure) (CMS/[facility])    • Diabetes (CMS/[facility])    • Hyperlipemia    • Hypertension    • Multiple vessel coronary artery disease 3/22/2021    Added automatically from request for surgery [id number]     Patient Active Problem List   Diagnosis   • Multiple vessel CAD   • CHF (CMS/[facility])   • Diabetes with HbA1C of 11.0 (CMS/[facility])   • Hyperlipemia   • CAD (coronary artery disease)   • Ischemic cardiomyopathy with LVEF 20-25%   • S/P CABG x 3 on 3/24/21   • PVD and bilateral BKA (CMS/[facility])   • Hx of amputations of most distal phalanges both hands    • Osteomyelitis R index finger due to dog bite (CMS/[facility])   • Postoperative PAF (CMS/[facility])   • Nocturnal hypoxemia       CAD (coronary artery disease) [I25.10]     Procedures Performed  Procedure(s):  MEDIAN STERNOTOMY,CORONARY ARTERY BYPASS GRAFTING X 3 with utilization of left mammary artery and ENDOSCOPIC VEIN HARVEST OF THE RIGHT GREATER SAPHENOUS VEIN, BREANNE     History of Present Illness  Patient is a 61 y.o. male who has had bilateral below-knee amputations.  The patient's had also multiple digits amputated from his hands.  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  He is on multiple p.o. antibiotics for this.  He presented with increasing shortness of breath.  He has been found to have severe three-vessel coronary disease and non-STEMI.  He is here for consideration of surgery.  He denies chest pain at this time.      Hospital Course  Patient was taken to the operating room on 3/24/21 for CABG xe with EVH.  Patient was transported to cardiac ICU intubated and in stable condition.  Extubated per ICU protocol  POD 1: [place] arterial line, echavarria removed. Abx as per ID.  POD 2: Chest tubes, wires removed.  POD 3:  Amiodarone for afib  POD 4:  Obtaining prostheses for ambulation  POD 5:  Transferred to telemetry  POD 6:  Decreased hand pain  POD 7:  Not ambulating or transferring with PT  POD 8:  Patient refusing rehab. Lifevest placed. Patient discharged home with family.      Discharge Medications     Discharge Medications      New Medications      Instructions Start Date   amiodarone 200 MG tablet  Commonly known as: PACERONE   200 mg, Oral, Every 12 Hours Scheduled      amiodarone 200 MG tablet  Commonly known as: PACERONE   200 mg, Oral, Every 24 Hours Scheduled   Start Date: April 20, 2021     carvedilol 6.25 MG tablet  Commonly known as: COREG   6.25 mg, Oral, 2 Times Daily With Meals      HYDROcodone-acetaminophen 7.5-325 MG per tablet  Commonly known as: NORCO   1 tablet, Oral, Every 6 Hours PRN      torsemide 10 MG tablet  Commonly known as: DEMADEX   10 mg, Oral, Daily         Changes to Medications      Instructions Start Date   Insulin Degludec 200 UNIT/ML solution pen-injector pen injection  Commonly known as: TRESIBA FLEXTOUCH  What changed:   · how much to take  · when to take this   20 Units, Subcutaneous, Daily      zolpidem 10 MG tablet  Commonly known as: Ambien  What changed:   · when to take this  · reasons to take this   10 mg, Oral, Nightly PRN         Continue These Medications      Instructions Start Date   albuterol sulfate  (90 Base) MCG/ACT inhaler  Commonly known as: PROVENTIL HFA;VENTOLIN HFA;PROAIR HFA   2 puffs, Inhalation, Every 4 Hours PRN      aspirin 81 MG chewable tablet   81 mg, Oral, Daily      atorvastatin 40 MG tablet  Commonly known as: LIPITOR   40 mg, Oral, Nightly      fluticasone 50 MCG/ACT nasal spray  Commonly known as: FLONASE   2 sprays, Nasal, Daily PRN      PARoxetine CR 37.5 MG 24 hr tablet  Commonly known as: PAXIL-CR   37.5 mg, Oral,  Every Evening      rOPINIRole 0.5 MG tablet  Commonly known as: REQUIP   0.5 mg, Oral, Nightly, Take 1 hour before bedtime.       saccharomyces boulardii 250 MG capsule  Commonly known as: FLORASTOR   250 mg, Oral, Daily      venlafaxine 75 MG tablet  Commonly known as: EFFEXOR   75 mg, Oral, Daily With Breakfast      Victoza 18 MG/3ML solution pen-injector injection  Generic drug: Liraglutide   1.2 mg, Subcutaneous, Daily         Stop These Medications    amLODIPine 10 MG tablet  Commonly known as: NORVASC     hydroCHLOROthiazide 25 MG tablet  Commonly known as: HYDRODIURIL     quinapril 20 MG tablet  Commonly known as: ACCUPRIL        ASK your doctor about these medications      Instructions Start Date   amiodarone 200 MG tablet  Commonly known as: PACERONE  Ask about: Should I take this medication?   200 mg, Oral, 3 Times Daily Before Meals      amoxicillin-clavulanate 875-125 MG per tablet  Commonly known as: AUGMENTIN  Ask about: Should I take this medication?   1 tablet, Oral, Every 12 Hours Scheduled      doxycycline 100 MG capsule  Commonly known as: MONODOX  Ask about: Should I take this medication?   100 mg, Oral, Every 12 Hours Scheduled             Discharge Diet:   Diet Instructions     Diet: Consistent Carbohydrate, Cardiac      Discharge Diet:  Consistent Carbohydrate  Cardiac             Activity at Discharge:   Activity Instructions     Bathing Restrictions      Type of Restriction: Bathing    Bathing Restrictions: No Tub Bath    Driving Restrictions      Type of Restriction: Driving    Driving Restrictions: No Driving Until Next Appointment    Lifting Restrictions      Type of Restriction: Lifting    Lifting Restrictions: Lifting Restriction (Indicate Limit)    Weight Limit (Pounds): 10    Length of Lifting Restriction: until next appointment          Follow-up Appointments  Future Appointments   Date Time Provider Department Center   4/27/2021  9:45 AM Parminder Dickson PA [facility] None    5/3/2021  1:00 PM Sallie Bennett, NGUYEN [facility] None        WOUND CARE: Monitor surgical wounds daily. Keep incisons clean and dry.   Call CT Surgery office [phone]  with any questions or concerns, specifically let them know of increased redness, drainage, or opening up of incision.       Dominique Cota PA-C  04/12/21  08:18 EDT

## 2021-04-15 ENCOUNTER — READMISSION MANAGEMENT (OUTPATIENT)
Dept: CALL CENTER | Facility: HOSPITAL | Age: 61
End: 2021-04-15

## 2021-04-15 NOTE — OUTREACH NOTE
CT Surgery Week 2 Survey      Responses   [facility], [place], operated by [facility] patient discharged from?  [place]   Does the patient have one of the following disease processes/diagnoses(primary or secondary)?  Cardiothoracic surgery   Week 2 attempt successful?  No   Unsuccessful attempts  Attempt 1          Quin Robb RN

## 2021-04-27 ENCOUNTER — OFFICE VISIT (OUTPATIENT)
Dept: CARDIOLOGY | Facility: CLINIC | Age: 61
End: 2021-04-27

## 2021-04-27 VITALS
SYSTOLIC BLOOD PRESSURE: 149 MMHG | HEART RATE: 77 BPM | OXYGEN SATURATION: 95 % | DIASTOLIC BLOOD PRESSURE: 92 MMHG | HEIGHT: 71 IN | BODY MASS INDEX: 35.07 KG/M2

## 2021-04-27 DIAGNOSIS — R06.02 SHORTNESS OF BREATH: ICD-10-CM

## 2021-04-27 DIAGNOSIS — I25.5 ISCHEMIC CARDIOMYOPATHY: Primary | ICD-10-CM

## 2021-04-27 DIAGNOSIS — I50.22 CHRONIC SYSTOLIC CHF (CONGESTIVE HEART FAILURE) (HCC): ICD-10-CM

## 2021-04-27 DIAGNOSIS — I25.10 CORONARY ARTERY DISEASE INVOLVING NATIVE CORONARY ARTERY OF NATIVE HEART WITHOUT ANGINA PECTORIS: ICD-10-CM

## 2021-04-27 PROCEDURE — 99214 OFFICE O/P EST MOD 30 MIN: CPT | Performed by: PHYSICIAN ASSISTANT

## 2021-04-27 RX ORDER — CARVEDILOL 12.5 MG/1
12.5 TABLET ORAL 2 TIMES DAILY WITH MEALS
Qty: 60 TABLET | Refills: 2 | Status: SHIPPED | OUTPATIENT
Start: 2021-04-27 | End: 2021-07-22

## 2021-04-27 NOTE — PATIENT INSTRUCTIONS
Heart-Healthy Eating Plan  Heart-healthy meal planning includes:  · Eating less unhealthy fats.  · Eating more healthy fats.  · Making other changes in your diet.  Talk with your doctor or a diet specialist (dietitian) to create an eating plan that is right for you.  What is my plan?  Your doctor may recommend an eating plan that includes:  · Total fat: ______% or less of total calories a day.  · Saturated fat: ______% or less of total calories a day.  · Cholesterol: less than _________mg a day.  What are tips for following this plan?  Cooking  Avoid frying your food. Try to bake, boil, grill, or broil it instead. You can also reduce fat by:  · Removing the skin from poultry.  · Removing all visible fats from meats.  · Steaming vegetables in water or broth.  Meal planning    · At meals, divide your plate into four equal parts:  ? Fill one-half of your plate with vegetables and green salads.  ? Fill one-fourth of your plate with whole grains.  ? Fill one-fourth of your plate with lean protein foods.  · Eat 4-5 servings of vegetables per day. A serving of vegetables is:  ? 1 cup of raw or cooked vegetables.  ? 2 cups of raw leafy greens.  · Eat 4-5 servings of fruit per day. A serving of fruit is:  ? 1 medium whole fruit.  ? ¼ cup of dried fruit.  ? ½ cup of fresh, frozen, or canned fruit.  ? ½ cup of 100% fruit juice.  · Eat more foods that have soluble fiber. These are apples, broccoli, carrots, beans, peas, and barley. Try to get 20-30 g of fiber per day.  · Eat 4-5 servings of nuts, legumes, and seeds per week:  ? 1 serving of dried beans or legumes equals ½ cup after being cooked.  ? 1 serving of nuts is ¼ cup.  ? 1 serving of seeds equals 1 tablespoon.  General information  · Eat more home-cooked food. Eat less restaurant, buffet, and fast food.  · Limit or avoid alcohol.  · Limit foods that are high in starch and sugar.  · Avoid fried foods.  · Lose weight if you are overweight.  · Keep track of how much salt  (sodium) you eat. This is important if you have high blood pressure. Ask your doctor to tell you more about this.  · Try to add vegetarian meals each week.  Fats  · Choose healthy fats. These include olive oil and canola oil, flaxseeds, walnuts, almonds, and seeds.  · Eat more omega-3 fats. These include salmon, mackerel, sardines, tuna, flaxseed oil, and ground flaxseeds. Try to eat fish at least 2 times each week.  · Check food labels. Avoid foods with trans fats or high amounts of saturated fat.  · Limit saturated fats.  ? These are often found in animal products, such as meats, butter, and cream.  ? These are also found in plant foods, such as palm oil, palm kernel oil, and coconut oil.  · Avoid foods with partially hydrogenated oils in them. These have trans fats. Examples are stick margarine, some tub margarines, cookies, crackers, and other baked goods.  What foods can I eat?  Fruits  All fresh, canned (in natural juice), or frozen fruits.  Vegetables  Fresh or frozen vegetables (raw, steamed, roasted, or grilled). Green salads.  Grains  Most grains. Choose whole wheat and whole grains most of the time. Rice and pasta, including brown rice and pastas made with whole wheat.  Meats and other proteins  Lean, well-trimmed beef, veal, pork, and lamb. Chicken and turkey without skin. All fish and shellfish. Wild duck, rabbit, pheasant, and venison. Egg whites or low-cholesterol egg substitutes. Dried beans, peas, lentils, and tofu. Seeds and most nuts.  Dairy  Low-fat or nonfat cheeses, including ricotta and mozzarella. Skim or 1% milk that is liquid, powdered, or evaporated. Buttermilk that is made with low-fat milk. Nonfat or low-fat yogurt.  Fats and oils  Non-hydrogenated (trans-free) margarines. Vegetable oils, including soybean, sesame, sunflower, olive, peanut, safflower, corn, canola, and cottonseed. Salad dressings or mayonnaise made with a vegetable oil.  Beverages  Mineral water. Coffee and tea. Diet  carbonated beverages.  Sweets and desserts  Sherbet, gelatin, and fruit ice. Small amounts of dark chocolate.  Limit all sweets and desserts.  Seasonings and condiments  All seasonings and condiments.  The items listed above may not be a complete list of foods and drinks you can eat. Contact a dietitian for more options.  What foods should I avoid?  Fruits  Canned fruit in heavy syrup. Fruit in cream or butter sauce. Fried fruit. Limit coconut.  Vegetables  Vegetables cooked in cheese, cream, or butter sauce. Fried vegetables.  Grains  Breads that are made with saturated or trans fats, oils, or whole milk. Croissants. Sweet rolls. Donuts. High-fat crackers, such as cheese crackers.  Meats and other proteins  Fatty meats, such as hot dogs, ribs, sausage, delong, rib-eye roast or steak. High-fat deli meats, such as salami and bologna. Caviar. Domestic duck and goose. Organ meats, such as liver.  Dairy  Cream, sour cream, cream cheese, and creamed cottage cheese. Whole-milk cheeses. Whole or 2% milk that is liquid, evaporated, or condensed. Whole buttermilk. Cream sauce or high-fat cheese sauce. Yogurt that is made from whole milk.  Fats and oils  Meat fat, or shortening. Cocoa butter, hydrogenated oils, palm oil, coconut oil, palm kernel oil. Solid fats and shortenings, including delong fat, salt pork, lard, and butter. Nondairy cream substitutes. Salad dressings with cheese or sour cream.  Beverages  Regular sodas and juice drinks with added sugar.  Sweets and desserts  Frosting. Pudding. Cookies. Cakes. Pies. Milk chocolate or white chocolate. Buttered syrups. Full-fat ice cream or ice cream drinks.  The items listed above may not be a complete list of foods and drinks to avoid. Contact a dietitian for more information.  Summary  · Heart-healthy meal planning includes eating less unhealthy fats, eating more healthy fats, and making other changes in your diet.  · Eat a balanced diet. This includes fruits and  vegetables, low-fat or nonfat dairy, lean protein, nuts and legumes, whole grains, and heart-healthy oils and fats.  This information is not intended to replace advice given to you by your health care provider. Make sure you discuss any questions you have with your health care provider.  Document Revised: 02/21/2019 Document Reviewed: 01/25/2019  [employer] Patient Education © 2021 [employer]

## 2021-04-27 NOTE — PROGRESS NOTES
Subjective   Dion De La Vega is a 61 y.o. male     Chief Complaint   Patient presents with   • Establish Care   • Congestive Heart Failure   • Post-op Open Heart Surgery   • Hypertension   Problem list  1.  Coronary artery disease  1.1 coronary artery bypass grafting by Dr. Kee at [facility] in [place] March 2021 with LIMA to LAD, vein graft to PDA, vein graft to circumflex  2.  Ischemic cardiomyopathy  2.1 EF estimated approximately 20 to 25% by echocardiogram March 2021  3.  Peripheral vascular disease with bilateral below the knee amputations  4.  History of osteomyelitis and multiple amputations of phalanges  5.  Postoperative atrial fibrillation currently on amiodarone therapy  6.  Insulin-dependent diabetes mellitus  7.  Dyslipidemia  8.  Chronic systolic heart failure  9.  Chronic kidney disease    HPI    Patient is a 61-year-old male that presents to the office today to establish care.  Patient went to local hospital in March when he was significantly short of breath.  Patient had congestive failure and subsequently underwent catheterization showing severe disease.  He has the above past medical history and is here today to follow-up    He is doing better.  He feels a soreness in his chest from patient's coronary artery bypass grafting but is doing better.  He has significant shortness of breath but that appears to relatively stable.  Patient for the most part is wheelchair dependent.  He is able to walk with prosthetics but does not do it as much.  He does not describe PND orthopnea.  He currently wears oxygen at night.    He does not describe palpitating or having dysrhythmic symptoms.  He otherwise appears stable.  He currently is wearing a LifeVest.      Current Outpatient Medications   Medication Sig Dispense Refill   • albuterol sulfate  (90 Base) MCG/ACT inhaler Inhale 2 puffs Every 4 (Four) Hours As Needed for Wheezing.     • amiodarone (PACERONE) 200 MG tablet Take 1 tablet by mouth  Daily for 30 days. 30 tablet 0   • aspirin 81 MG chewable tablet Chew 81 mg Daily.     • atorvastatin (LIPITOR) 40 MG tablet Take 40 mg by mouth Every Night.     • carvedilol (COREG) 12.5 MG tablet Take 1 tablet by mouth 2 (Two) Times a Day With Meals for 90 days. 60 tablet 2   • fluticasone (FLONASE) 50 MCG/ACT nasal spray 2 sprays into the nostril(s) as directed by provider Daily As Needed for Allergies.     • HYDROcodone-acetaminophen (NORCO) 7.5-325 MG per tablet Take 1 tablet by mouth Every 6 (Six) Hours As Needed for Moderate Pain . 30 tablet 0   • Insulin Degludec (TRESIBA FLEXTOUCH) 200 UNIT/ML solution pen-injector pen injection Inject 20 Units under the skin into the appropriate area as directed Daily for 30 days. 3 mL 0   • Liraglutide (Victoza) 18 MG/3ML solution pen-injector injection Inject 1.2 mg under the skin into the appropriate area as directed Daily.     • PARoxetine CR (PAXIL-CR) 37.5 MG 24 hr tablet Take 37.5 mg by mouth Every Evening.     • rOPINIRole (REQUIP) 0.5 MG tablet Take 0.5 mg by mouth Every Night. Take 1 hour before bedtime.     • saccharomyces boulardii (FLORASTOR) 250 MG capsule Take 250 mg by mouth Daily.     • torsemide (DEMADEX) 10 MG tablet Take 1 tablet by mouth Daily for 30 days. 30 tablet 0   • venlafaxine (EFFEXOR) 75 MG tablet Take 75 mg by mouth Daily With Breakfast.     • zolpidem (Ambien) 10 MG tablet Take 1 tablet by mouth At Night As Needed for Sleep for up to 30 days. 30 tablet 0     No current facility-administered medications for this visit.       Patient has no known allergies.    Past Medical History:   Diagnosis Date   • CHF (congestive heart failure) (CMS/HCC)    • Diabetes (CMS/HCC)    • Hyperlipemia    • Hypertension    • Multiple vessel coronary artery disease 3/22/2021    Added automatically from request for surgery [id number]       Social History     Socioeconomic History   • Marital status:      Spouse name: Not on file   • Number of children: Not on  "file   • Years of education: Not on file   • Highest education level: Not on file   Tobacco Use   • Smoking status: Never Smoker   • Smokeless tobacco: Never Used   Substance and Sexual Activity   • Alcohol use: Not Currently   • Drug use: Not Currently   • Sexual activity: Defer       Family History   Problem Relation Age of Onset   • Cancer Mother    • Cancer Father    • Heart attack Brother        Review of Systems   Constitutional: Negative for chills, fatigue and fever.   HENT: Negative.  Negative for congestion, rhinorrhea and sore throat.    Eyes: Positive for visual disturbance (glasses).   Respiratory: Positive for apnea (supplemental oxygen), chest tightness and shortness of breath.    Cardiovascular: Positive for chest pain and leg swelling (double amputee). Negative for palpitations.   Gastrointestinal: Negative.    Endocrine: Negative.    Genitourinary: Negative.    Musculoskeletal: Negative for back pain, gait problem, neck pain and neck stiffness.   Skin: Positive for wound (R hand). Negative for rash.   Allergic/Immunologic: Negative.  Negative for environmental allergies and food allergies.   Neurological: Positive for dizziness (when sitting up) and weakness. Negative for light-headedness, numbness and headaches.   Hematological: Negative.  Does not bruise/bleed easily.   Psychiatric/Behavioral: Positive for sleep disturbance.       Objective   Vitals:    04/27/21 0949   BP: 149/92   BP Location: Left arm   Patient Position: Sitting   Pulse: 77   SpO2: 95%   Height: 180.3 cm (70.98\")      /92 (BP Location: Left arm, Patient Position: Sitting)   Pulse 77   Ht 180.3 cm (70.98\")   SpO2 95%   BMI 35.07 kg/m²     Lab Results (most recent)     None          Physical Exam  Vitals and nursing note reviewed.   Constitutional:       General: He is not in acute distress.     Appearance: Normal appearance. He is well-developed.   HENT:      Head: Normocephalic and atraumatic.   Eyes:      General: No " scleral icterus.        Right eye: No discharge.         Left eye: No discharge.      Conjunctiva/sclera: Conjunctivae normal.   Neck:      Vascular: No carotid bruit.   Cardiovascular:      Rate and Rhythm: Normal rate and regular rhythm.      Heart sounds: Normal heart sounds. No murmur heard.   No friction rub. No gallop.    Pulmonary:      Effort: Pulmonary effort is normal. No respiratory distress.      Breath sounds: Normal breath sounds. No wheezing or rales.   Chest:      Chest wall: No tenderness.   Musculoskeletal:      Right lower leg: No edema.      Left lower leg: No edema.      Comments: Bilateral below the knee amputation    Multiple phalangeal amputations   Skin:     General: Skin is warm and dry.      Coloration: Skin is not pale.      Findings: No erythema or rash.   Neurological:      Mental Status: He is alert and oriented to person, place, and time.      Cranial Nerves: No cranial nerve deficit.   Psychiatric:         Behavior: Behavior normal.         Procedure   Procedures         Assessment/Plan     Problems Addressed this Visit        Cardiac and Vasculature    Coronary artery disease involving native coronary artery of native heart without angina pectoris    Relevant Medications    carvedilol (COREG) 12.5 MG tablet    Other Relevant Orders    NUCLEAR MEDICINE CARDIAC BLOOD POOL MUGA REST STRESS    Ischemic cardiomyopathy with LVEF 20-25% - Primary    Relevant Medications    carvedilol (COREG) 12.5 MG tablet    Other Relevant Orders    NUCLEAR MEDICINE CARDIAC BLOOD POOL MUGA REST STRESS    Chronic systolic CHF (congestive heart failure) (CMS/HCC)    Relevant Medications    carvedilol (COREG) 12.5 MG tablet    Other Relevant Orders    NUCLEAR MEDICINE CARDIAC BLOOD POOL MUGA REST STRESS       Pulmonary and Pneumonias    Shortness of breath    Relevant Orders    NUCLEAR MEDICINE CARDIAC BLOOD POOL MUGA REST STRESS      Diagnoses       Codes Comments    Ischemic cardiomyopathy    -  Primary  ICD-10-CM: I25.5  ICD-9-CM: 414.8     Chronic systolic CHF (congestive heart failure) (CMS/[facility])     ICD-10-CM: I50.22  ICD-9-CM: 428.22, 428.0     Coronary artery disease involving native coronary artery of native heart without angina pectoris     ICD-10-CM: I25.10  ICD-9-CM: 414.01     Shortness of breath     ICD-10-CM: R06.02  ICD-9-CM: 786.05           Recommendation  1.  Patient with coronary artery disease with coronary bypass grafting and severe ischemic cardiomyopathy that appears relatively stable today.  Patient currently is on diuretic therapy as well as aspirin and statin therapy with medications to help with cardiomyopathy    2.  For now we will continue to monitor.  I am scheduling a MUGA scan to be performed in June to reassess LV function.  If significantly depressed, patient will likely be referred for AICD placement    3.  Patient continuing diuretic therapy appears to be doing well.  Recent chest x-ray showed no evidence of pleural effusion and he appears relatively stable    4.  For now I would like to see him back for follow-up after MUGA scan.  I am increasing carvedilol to 12.5 mg twice a day.  Because of the chronic kidney disease, we will avoid ACE inhibitor or angiotensin receptor blocker    5.  We will see him back for follow-up as discussed or sooner if symptoms occur as discussed.  Follow-up with primary as scheduled             Dion De La Vega  reports that he has never smoked. He has never used smokeless tobacco.          Patient's Body mass index is 35.07 kg/m². BMI is above normal parameters. Recommendations include: educational material.         Electronically signed by:

## 2021-05-06 ENCOUNTER — TELEPHONE (OUTPATIENT)
Dept: CARDIAC SURGERY | Facility: CLINIC | Age: 61
End: 2021-05-06

## 2021-06-30 ENCOUNTER — APPOINTMENT (OUTPATIENT)
Dept: CARDIOLOGY | Facility: HOSPITAL | Age: 61
End: 2021-06-30

## 2021-07-19 ENCOUNTER — APPOINTMENT (OUTPATIENT)
Dept: CARDIOLOGY | Facility: HOSPITAL | Age: 61
End: 2021-07-19

## 2021-07-22 DIAGNOSIS — I50.22 CHRONIC SYSTOLIC CHF (CONGESTIVE HEART FAILURE) (HCC): ICD-10-CM

## 2021-07-22 DIAGNOSIS — I25.5 ISCHEMIC CARDIOMYOPATHY: Primary | ICD-10-CM

## 2021-07-22 RX ORDER — CARVEDILOL 12.5 MG/1
TABLET ORAL
Qty: 180 TABLET | Refills: 1 | Status: SHIPPED | OUTPATIENT
Start: 2021-07-22

## 2021-07-27 ENCOUNTER — OFFICE VISIT (OUTPATIENT)
Dept: CARDIOLOGY | Facility: CLINIC | Age: 61
End: 2021-07-27

## 2021-07-27 VITALS
HEART RATE: 74 BPM | DIASTOLIC BLOOD PRESSURE: 97 MMHG | WEIGHT: 243 LBS | SYSTOLIC BLOOD PRESSURE: 167 MMHG | BODY MASS INDEX: 34.02 KG/M2 | HEIGHT: 71 IN

## 2021-07-27 DIAGNOSIS — R06.02 SHORTNESS OF BREATH: ICD-10-CM

## 2021-07-27 DIAGNOSIS — I42.9 CARDIOMYOPATHY, UNSPECIFIED TYPE (HCC): Primary | ICD-10-CM

## 2021-07-27 DIAGNOSIS — I25.10 CORONARY ARTERY DISEASE INVOLVING NATIVE CORONARY ARTERY OF NATIVE HEART WITHOUT ANGINA PECTORIS: ICD-10-CM

## 2021-07-27 DIAGNOSIS — I50.20 SYSTOLIC CONGESTIVE HEART FAILURE, UNSPECIFIED HF CHRONICITY (HCC): ICD-10-CM

## 2021-07-27 PROCEDURE — 99214 OFFICE O/P EST MOD 30 MIN: CPT | Performed by: PHYSICIAN ASSISTANT

## 2021-07-27 RX ORDER — SACUBITRIL AND VALSARTAN 49; 51 MG/1; MG/1
1 TABLET, FILM COATED ORAL 2 TIMES DAILY
Qty: 60 TABLET | Refills: 5 | Status: SHIPPED | OUTPATIENT
Start: 2021-07-27

## 2021-07-27 RX ORDER — FUROSEMIDE 20 MG/1
20 TABLET ORAL DAILY
Qty: 30 TABLET | Refills: 11 | Status: SHIPPED | OUTPATIENT
Start: 2021-07-27

## 2021-07-27 NOTE — PATIENT INSTRUCTIONS
Advance Care Planning and Advance Directives     You make decisions on a daily basis - decisions about where you want to live, your career, your home, your life. Perhaps one of the most important decisions you face is your choice for future medical care. Take time to talk with your family and your healthcare team and start planning today.  Advance Care Planning is a process that can help you:  · Understand possible future healthcare decisions in light of your own experiences  · Reflect on those decision in light of your goals and values  · Discuss your decisions with those closest to you and the healthcare professionals that care for you  · Make a plan by creating a document that reflects your wishes    Surrogate Decision Maker  In the event of a medical emergency, which has left you unable to communicate or to make your own decisions, you would need someone to make decisions for you.  It is important to discuss your preferences for medical treatment with this person while you are in good health.     Qualities of a surrogate decision maker:  • Willing to take on this role and responsibility  • Knows what you want for future medical care  • Willing to follow your wishes even if they don't agree with them  • Able to make difficult medical decisions under stressful circumstances    Advance Directives  These are legal documents you can create that will guide your healthcare team and decision maker(s) when needed. These documents can be stored in the electronic medical record.    · Living Will - a legal document to guide your care if you have a terminal condition or a serious illness and are unable to communicate. States vary by statute in document names/types, but most forms may include one or more of the following:        -  Directions regarding life-prolonging treatments        -  Directions regarding artificially provided nutrition/hydration        -  Choosing a healthcare decision maker        -  Direction  regarding organ/tissue donation    · Durable Power of  for Healthcare - this document names an -in-fact to make medical decisions for you, but it may also allow this person to make personal and financial decisions for you. Please seek the advice of an  if you need this type of document.    **Advance Directives are not required and no one may discriminate against you if you do not sign one.    Medical Orders  Many states allow specific forms/orders signed by your physician to record your wishes for medical treatment in your current state of health. This form, signed in personal communication with your physician, addresses resuscitation and other medical interventions that you may or may not want.      For more information or to schedule a time with a [facility] Advance Care Planning Facilitator contact: [facility] or call [phone] and someone will contact you directly.Heart-Healthy Eating Plan  Heart-healthy meal planning includes:  · Eating less unhealthy fats.  · Eating more healthy fats.  · Making other changes in your diet.  Talk with your doctor or a diet specialist (dietitian) to create an eating plan that is right for you.  What is my plan?  Your doctor may recommend an eating plan that includes:  · Total fat: ______% or less of total calories a day.  · Saturated fat: ______% or less of total calories a day.  · Cholesterol: less than _________mg a day.  What are tips for following this plan?  Cooking  Avoid frying your food. Try to bake, boil, grill, or broil it instead. You can also reduce fat by:  · Removing the skin from poultry.  · Removing all visible fats from meats.  · Steaming vegetables in water or broth.  Meal planning    · At meals, divide your plate into four equal parts:  ? Fill one-half of your plate with vegetables and green salads.  ? Fill one-fourth of your plate with whole grains.  ? Fill one-fourth of your plate with lean protein foods.  · Eat 4-5  servings of vegetables per day. A serving of vegetables is:  ? 1 cup of raw or cooked vegetables.  ? 2 cups of raw leafy greens.  · Eat 4-5 servings of fruit per day. A serving of fruit is:  ? 1 medium whole fruit.  ? ¼ cup of dried fruit.  ? ½ cup of fresh, frozen, or canned fruit.  ? ½ cup of 100% fruit juice.  · Eat more foods that have soluble fiber. These are apples, broccoli, carrots, beans, peas, and barley. Try to get 20-30 g of fiber per day.  · Eat 4-5 servings of nuts, legumes, and seeds per week:  ? 1 serving of dried beans or legumes equals ½ cup after being cooked.  ? 1 serving of nuts is ¼ cup.  ? 1 serving of seeds equals 1 tablespoon.  General information  · Eat more home-cooked food. Eat less restaurant, buffet, and fast food.  · Limit or avoid alcohol.  · Limit foods that are high in starch and sugar.  · Avoid fried foods.  · Lose weight if you are overweight.  · Keep track of how much salt (sodium) you eat. This is important if you have high blood pressure. Ask your doctor to tell you more about this.  · Try to add vegetarian meals each week.  Fats  · Choose healthy fats. These include olive oil and canola oil, flaxseeds, walnuts, almonds, and seeds.  · Eat more omega-3 fats. These include salmon, mackerel, sardines, tuna, flaxseed oil, and ground flaxseeds. Try to eat fish at least 2 times each week.  · Check food labels. Avoid foods with trans fats or high amounts of saturated fat.  · Limit saturated fats.  ? These are often found in animal products, such as meats, butter, and cream.  ? These are also found in plant foods, such as palm oil, palm kernel oil, and coconut oil.  · Avoid foods with partially hydrogenated oils in them. These have trans fats. Examples are stick margarine, some tub margarines, cookies, crackers, and other baked goods.  What foods can I eat?  Fruits  All fresh, canned (in natural juice), or frozen fruits.  Vegetables  Fresh or frozen vegetables (raw, steamed, roasted,  or grilled). Green salads.  Grains  Most grains. Choose whole wheat and whole grains most of the time. Rice and pasta, including brown rice and pastas made with whole wheat.  Meats and other proteins  Lean, well-trimmed beef, veal, pork, and lamb. Chicken and turkey without skin. All fish and shellfish. Wild duck, rabbit, pheasant, and venison. Egg whites or low-cholesterol egg substitutes. Dried beans, peas, lentils, and tofu. Seeds and most nuts.  Dairy  Low-fat or nonfat cheeses, including ricotta and mozzarella. Skim or 1% milk that is liquid, powdered, or evaporated. Buttermilk that is made with low-fat milk. Nonfat or low-fat yogurt.  Fats and oils  Non-hydrogenated (trans-free) margarines. Vegetable oils, including soybean, sesame, sunflower, olive, peanut, safflower, corn, canola, and cottonseed. Salad dressings or mayonnaise made with a vegetable oil.  Beverages  Mineral water. Coffee and tea. Diet carbonated beverages.  Sweets and desserts  Sherbet, gelatin, and fruit ice. Small amounts of dark chocolate.  Limit all sweets and desserts.  Seasonings and condiments  All seasonings and condiments.  The items listed above may not be a complete list of foods and drinks you can eat. Contact a dietitian for more options.  What foods should I avoid?  Fruits  Canned fruit in heavy syrup. Fruit in cream or butter sauce. Fried fruit. Limit coconut.  Vegetables  Vegetables cooked in cheese, cream, or butter sauce. Fried vegetables.  Grains  Breads that are made with saturated or trans fats, oils, or whole milk. Croissants. Sweet rolls. Donuts. High-fat crackers, such as cheese crackers.  Meats and other proteins  Fatty meats, such as hot dogs, ribs, sausage, delong, rib-eye roast or steak. High-fat deli meats, such as salami and bologna. Caviar. Domestic duck and goose. Organ meats, such as liver.  Dairy  Cream, sour cream, cream cheese, and creamed cottage cheese. Whole-milk cheeses. Whole or 2% milk that is liquid,  evaporated, or condensed. Whole buttermilk. Cream sauce or high-fat cheese sauce. Yogurt that is made from whole milk.  Fats and oils  Meat fat, or shortening. Cocoa butter, hydrogenated oils, palm oil, coconut oil, palm kernel oil. Solid fats and shortenings, including delong fat, salt pork, lard, and butter. Nondairy cream substitutes. Salad dressings with cheese or sour cream.  Beverages  Regular sodas and juice drinks with added sugar.  Sweets and desserts  Frosting. Pudding. Cookies. Cakes. Pies. Milk chocolate or white chocolate. Buttered syrups. Full-fat ice cream or ice cream drinks.  The items listed above may not be a complete list of foods and drinks to avoid. Contact a dietitian for more information.  Summary  · Heart-healthy meal planning includes eating less unhealthy fats, eating more healthy fats, and making other changes in your diet.  · Eat a balanced diet. This includes fruits and vegetables, low-fat or nonfat dairy, lean protein, nuts and legumes, whole grains, and heart-healthy oils and fats.  This information is not intended to replace advice given to you by your health care provider. Make sure you discuss any questions you have with your health care provider.  Document Revised: 02/21/2019 Document Reviewed: 01/25/2019  [employer] Patient Education © 2021 [employer]

## 2021-07-27 NOTE — PROGRESS NOTES
Problem list     Subjective   Dion De La Vega is a 61 y.o. male     Chief Complaint   Patient presents with   • Follow-up   • Cardiomyopathy   Problem list  1.  Coronary artery disease  1.1 coronary artery bypass grafting by Dr. Kee at [facility] in [place] March 2021 with LIMA to LAD, vein graft to PDA, vein graft to circumflex  2.  Ischemic cardiomyopathy  2.1 EF estimated approximately 20 to 25% by echocardiogram March 2021  2.2 repeat echocardiogram May 2021 with an EF estimated at 30 to 35%  3.  Peripheral vascular disease with bilateral below the knee amputations  4.  History of osteomyelitis and multiple amputations of phalanges  5.  Postoperative atrial fibrillation with return to sinus rhythm no recurrent episodes of atrial fibrillation  6.  Insulin-dependent diabetes mellitus  7.  Dyslipidemia  8.  Chronic systolic heart failure  9.  Chronic kidney disease       HPI    Patient is a 61-year-old male who presents to the office for evaluation.  Patient recently was hospitalized in May in the setting of congestive heart failure.  Patient underwent diuresis to help with the same.  He had repeat echocardiogram showing slight improvement of LV systolic function but continued LV systolic dysfunction.    He feels well.  He has occasional chest pain on the right and left side of his chest that appears to be sharp or an aching sensation but it does not appear to be ischemic.  Patient has dyspnea that is at the least mild.  He has had a degree of fatigue and weakness.  He does not describe PND.    He does not describe palpitating or having presyncope or syncope.  He has had orthostatic lightheadedness.  Otherwise he has been stable                  Patient did not bring med list or medicine bottles to appointment, med list has been reviewed and updated based on patient's knowledge of their meds.     Current Outpatient Medications on File Prior to Visit   Medication Sig Dispense Refill   • albuterol sulfate HFA  108 (90 Base) MCG/ACT inhaler Inhale 2 puffs Every 4 (Four) Hours As Needed for Wheezing.     • aspirin 81 MG chewable tablet Chew 81 mg Daily.     • atorvastatin (LIPITOR) 40 MG tablet Take 40 mg by mouth Every Night.     • carvedilol (COREG) 12.5 MG tablet TAKE 1 TABLET BY MOUTH 2 (TWO) TIMES A DAY WITH MEALS 180 tablet 1   • fluticasone (FLONASE) 50 MCG/ACT nasal spray 2 sprays into the nostril(s) as directed by provider Daily As Needed for Allergies.     • HYDROcodone-acetaminophen (NORCO) 7.5-325 MG per tablet Take 1 tablet by mouth Every 6 (Six) Hours As Needed for Moderate Pain . 30 tablet 0   • Liraglutide (Victoza) 18 MG/3ML solution pen-injector injection Inject 1.2 mg under the skin into the appropriate area as directed Daily.     • PARoxetine CR (PAXIL-CR) 37.5 MG 24 hr tablet Take 37.5 mg by mouth Every Evening.     • rOPINIRole (REQUIP) 0.5 MG tablet Take 0.5 mg by mouth Every Night. Take 1 hour before bedtime.     • saccharomyces boulardii (FLORASTOR) 250 MG capsule Take 250 mg by mouth Daily.     • venlafaxine (EFFEXOR) 75 MG tablet Take 75 mg by mouth Daily With Breakfast.     • [DISCONTINUED] torsemide (DEMADEX) 10 MG tablet Take 1 tablet by mouth Daily for 30 days. 30 tablet 0     No current facility-administered medications on file prior to visit.       Patient has no known allergies.    Past Medical History:   Diagnosis Date   • CHF (congestive heart failure) (CMS/HCC)    • Diabetes (CMS/HCC)    • Edema    • Hyperlipemia    • Hypertension    • Multiple vessel coronary artery disease 3/22/2021    Added automatically from request for surgery [id number]       Social History     Socioeconomic History   • Marital status:      Spouse name: Not on file   • Number of children: Not on file   • Years of education: Not on file   • Highest education level: Not on file   Tobacco Use   • Smoking status: Never Smoker   • Smokeless tobacco: Never Used   Substance and Sexual Activity   • Alcohol use: Not  "Currently   • Drug use: Not Currently   • Sexual activity: Defer       Family History   Problem Relation Age of Onset   • Cancer Mother    • Cancer Father    • Heart attack Brother        Review of Systems   Constitutional: Positive for fatigue.   Eyes: Positive for visual disturbance (glasses).   Respiratory: Positive for apnea (supplemental oxygen @ 2.5L), chest tightness and shortness of breath (believes might be due to anxiety).    Cardiovascular: Positive for chest pain. Negative for palpitations and leg swelling.   Musculoskeletal: Positive for arthralgias, back pain and gait problem (in wheelchair due to bilateral leg amputation). Negative for neck pain and neck stiffness.   Skin: Positive for wound (on fingers). Negative for rash.   Allergic/Immunologic: Negative.  Negative for environmental allergies and food allergies.   Neurological: Positive for weakness, numbness (hands) and headaches (occas slight headache). Negative for dizziness and light-headedness.   Hematological: Negative.  Does not bruise/bleed easily.   Psychiatric/Behavioral: Negative.  Negative for sleep disturbance.       Objective   Vitals:    07/27/21 0854   BP: 167/97   BP Location: Left arm   Patient Position: Sitting   Pulse: 74   Weight: 110 kg (243 lb)   Height: 180.3 cm (70.98\")      /97 (BP Location: Left arm, Patient Position: Sitting)   Pulse 74   Ht 180.3 cm (70.98\")   Wt 110 kg (243 lb) Comment: stated per pt  BMI 33.91 kg/m²     Lab Results (most recent)     None          Physical Exam  Vitals and nursing note reviewed.   Constitutional:       General: He is not in acute distress.     Appearance: Normal appearance. He is well-developed.   HENT:      Head: Normocephalic and atraumatic.   Eyes:      General: No scleral icterus.        Right eye: No discharge.         Left eye: No discharge.      Conjunctiva/sclera: Conjunctivae normal.   Neck:      Vascular: No carotid bruit.   Cardiovascular:      Rate and Rhythm: " Normal rate and regular rhythm.      Heart sounds: Normal heart sounds. No murmur heard.   No friction rub. No gallop.    Pulmonary:      Effort: Pulmonary effort is normal. No respiratory distress.      Breath sounds: Normal breath sounds. No wheezing or rales.   Chest:      Chest wall: No tenderness.   Musculoskeletal:      Comments: Bilateral lower extremity amputee   Skin:     General: Skin is warm and dry.      Coloration: Skin is not pale.      Findings: No erythema or rash.   Neurological:      Mental Status: He is alert and oriented to person, place, and time.      Cranial Nerves: No cranial nerve deficit.   Psychiatric:         Behavior: Behavior normal.         Procedure   Procedures       Assessment/Plan     Problems Addressed this Visit        Cardiac and Vasculature    CHF (CMS/[facility])    Relevant Medications    sacubitril-valsartan (Entresto) 49-51 MG tablet    Other Relevant Orders    Basic Metabolic Panel    Coronary artery disease involving native coronary artery of native heart without angina pectoris    Relevant Medications    sacubitril-valsartan (Entresto) 49-51 MG tablet    Cardiomyopathy (CMS/[facility]) - Primary    Relevant Medications    sacubitril-valsartan (Entresto) 49-51 MG tablet    Other Relevant Orders    Basic Metabolic Panel       Pulmonary and Pneumonias    Shortness of breath      Diagnoses       Codes Comments    Cardiomyopathy, unspecified type (CMS/[facility])    -  Primary ICD-10-CM: I42.9  ICD-9-CM: 425.4     Systolic congestive heart failure, unspecified HF chronicity (CMS/[facility])     ICD-10-CM: I50.20  ICD-9-CM: 428.20, 428.0     Shortness of breath     ICD-10-CM: R06.02  ICD-9-CM: 786.05     Coronary artery disease involving native coronary artery of native heart without angina pectoris     ICD-10-CM: I25.10  ICD-9-CM: 414.01           Recommendation  1.  Patient is a 61-year-old male with history of coronary bypass grafting a few months ago that has significant cardiomyopathy and chronic  heart failure.  He has atypical chest pain at this point.    2.  I would like to adjust medications to treat cardiomyopathy and failure.  I want to place him on Entresto with his carvedilol which will hopefully decrease heart failure and improve symptoms.    3.  I would like to stop torsemide that he was on previously and put him on Lasix.  I want to check laboratories in 1 week to ensure no azotemia or significant electrolyte abnormality.  4.  We discussed the risk of sudden cardiac death with the patient.  Patient acknowledges.  He does not seem interested in an AICD at this time.  If he does to consider, he will contact our office.  Otherwise we will make these adjustments to medications and see him back in a few months.  Follow-up with primary as scheduled         Electronically signed by:

## 2024-10-08 NOTE — OUTREACH NOTE
October 8, 2024       Brant Mccurdy MD  [address]  [address]  [facility] [address]  Via In Basket      Patient: Indio Parks   YOB: 1957   Date of Visit: 10/8/2024       Dear Dr. Mccurdy:    Thank you for referring Indio Parks to me for evaluation. Below are my notes for this visit with him.    If you have questions, please do not hesitate to call me. I look forward to following your patient along with you.      Sincerely,        Heaven Dorantes MD        CC: No Recipients  Heaven Dorantes MD  10/8/2024  5:45 PM  Signed      REFERRED BY:  Brant Mccurdy MD   Fax: [phone]       Patient ID: Indio Parks is a 67 year old male.      Chief Complaint   Patient presents with   • Follow-up     Indio is here for 3 month follow up. He denies chest pain, palpitations and SOB. He reports some slight bilateral swelling in legs. He sates he will have some intermittent dizziness.            HISTORY OF PRESENTING COMPLAINT       I had the pleasure of seeing Indio Parks on cardiology office follow up in my office on 10/08/24.    Indio Parks is here for follow up. He was at [facility] for abdominal pain and pancytopenia. He got PRBC in the hospital. He did have bradycardia - his BB was stopped. BP remains elevated at home after BB discontinued.    No CP some SOB .     CURRENT MEDICATIONS     Current Outpatient Medications   Medication Sig Dispense Refill   • hydrALAZINE (APRESOLINE) 50 MG tablet Take 1 tablet by mouth in the morning and 1 tablet in the evening. 90 tablet 3   • famotidine (PEPCID) 20 MG tablet TAKE 1 TABLET IN THE MORNING IN YOUR FIRST DAY OF TREATMENT (9/27)     • montelukast (SINGULAIR) 10 MG tablet TAKE 1 TABLET IN THE MORNING ON YOUR FIRST DAY OF TREATMENT (9/27)     • atorvastatin (LIPITOR) 40 MG tablet TAKE 1 TABLET BY MOUTH EVERY DAY (Patient taking differently: Take 40 mg by mouth at bedtime.) 90 tablet 3   • dutasteride (AVODART) 0.5 MG capsule TAKE 1 CAPSULE BY MOUTH  Prep Survey      Responses   [facility] facility patient discharged from?  [place]   Is LACE score < 7 ?  No   Emergency Room discharge w/ pulse ox?  No   Eligibility  Readm Mgmt   Discharge diagnosis  CABG   Does the patient have one of the following disease processes/diagnoses(primary or secondary)?  Cardiothoracic surgery   Does the patient have Home health ordered?  No   Is there a DME ordered?  Yes   What DME was ordered?  lifevest, O2, WC   Comments regarding appointments  see AVS   Medication alerts for this patient  see AVS   Prep survey completed?  Yes          Giuliana Felder RN         EVERY DAY 90 capsule 3   • acyclovir (ZOVIRAX) 400 MG tablet Take 400 mg by mouth in the morning and 400 mg in the evening.       No current facility-administered medications for this visit.            REVIEW OF SYMPTOMS     Review of Systems   Constitutional: Positive for malaise/fatigue.   HENT: Negative.     Eyes: Negative.    Cardiovascular:  Negative for chest pain, dyspnea on exertion, leg swelling, orthopnea, palpitations and paroxysmal nocturnal dyspnea.   Respiratory: Negative.  Negative for shortness of breath.    Endocrine: Negative.    Hematologic/Lymphatic: Negative.    Skin: Negative.    Musculoskeletal: Negative.    Gastrointestinal: Negative.    Genitourinary: Negative.    Neurological: Negative.    Psychiatric/Behavioral: Negative.     Allergic/Immunologic: Negative.              VITAL SIGNS     Vitals:    10/08/24 1518   BP: (!) 162/100   BP Location: E - Left upper extremity   Patient Position: Sitting   Cuff Size: Regular   Pulse: 64   Weight: 79.5 kg (175 lb 6 oz)   Height: 5' 6\" (1.676 m)            PHYSICAL EXAM     Physical Exam  Vitals and nursing note reviewed.   Constitutional:       Appearance: He is normal weight.   HENT:      Head: Normocephalic and atraumatic.      Nose: Nose normal.      Mouth/Throat:      Mouth: Mucous membranes are moist.   Eyes:      Conjunctiva/sclera: Conjunctivae normal.   Cardiovascular:      Rate and Rhythm: Regular rhythm. Bradycardia present.      Pulses: Normal pulses.           Carotid pulses are 2+ on the right side and 2+ on the left side.       Radial pulses are 2+ on the right side and 2+ on the left side.        Dorsalis pedis pulses are 2+ on the right side and 2+ on the left side.        Posterior tibial pulses are 2+ on the right side and 2+ on the left side.      Heart sounds: Normal heart sounds. No murmur heard.  Pulmonary:      Effort: Pulmonary effort is normal.      Breath sounds: Normal breath sounds.   Abdominal:      General: Abdomen is  flat. Bowel sounds are normal.      Palpations: Abdomen is soft.   Musculoskeletal:      Right lower leg: No edema.      Left lower leg: No edema.   Skin:     General: Skin is warm.      Capillary Refill: Capillary refill takes less than 2 seconds.   Neurological:      General: No focal deficit present.      Mental Status: He is alert and oriented to person, place, and time.                LABORATORY RESULTS       Recent Labs   Lab 09/04/24  0528 03/16/24  0311 02/20/24  1542   Sodium 142   < > 143   Chloride 109   < > 111*   BUN 20   < > 36*   Potassium 4.3   < > 4.7   Glucose 86   < > 88   Creatinine 1.88*   < > 2.27*   Calcium 7.4*   < > 8.7   TSH  --   --  1.570    < > = values in this interval not displayed.       Recent Labs   Lab 09/16/24  0734 09/04/24  0528 09/03/24  0458   WBC 2.5* 2.5* 1.6*   RBC 2.85* 2.59* 2.13*   HGB 9.4* 8.8* 7.1*   HCT 30.5* 27.1* 23.2*   .0* 104.6* 108.9*   MCHC 30.8* 32.5 30.6*   RDW-CV 19.5* 20.8* 18.2*   PLT 25* 26* 30*   Lymphocytes, Percent 55 31 54       No results for input(s): \"ALB\", \"DBIL\", \"TP\" in the last 8765 hours.    Invalid input(s): \"TBIL\", \"ALKP\", \"GOT\"     No results for input(s): \"NTPROB\" in the last 8765 hours.        ASCVD RISK SCORE       The ASCVD Risk score (Liz DK, et al., 2019) failed to calculate for the following reasons:    The patient has a prior MI or stroke diagnosis       LIPIDS     Recent Labs   Lab 02/20/24  1542   Cholesterol 108   HDL 74   Triglycerides 89   CALCLDL 16   Non-HDL Cholesterol 34           EKG       IMAGING     ECHOCARDIOGRAM  Date:03/16/24    1. Left ventricle: The cavity size is normal. Wall thickness is normal.     Systolic function is normal. The ejection fraction was measured by biplane     method of disks. There is no evidence of a thrombus . The ejection fraction     is 60%.  2. Regional wall motion: There is akinesis of the apex.  3. Aortic valve: There is mild to moderate regurgitation.  4. Right ventricle: The  cavity size is mildly dilated. Wall thickness is     normal. Systolic function is normal.    STRESS TEST  Date:03/16/24    1. Stress ECG conclusions: Sinus bradycardia. Nonspecific ST/T. The stress ECG     is equivocal for ischemia.  2. Baseline ECG: Sinus bradycardia. Nonspecific ST and T wave changes. There     is evidence of an old myocardial infarction.  Impressions:   Nondiagnostic for ischemia.    1. Small chronic myocardial infarction at the apex.  2. No reversing perfusion defect.  3. Good left ventricular wall thickening with estimated LVEF of 60%.     IMPRESSION & PLAN       Problem List Items Addressed This Visit       Chest pain with high risk of acute coronary syndrome    VERNA (obstructive sleep apnea)     Using CPAP and compliant with it         History of myocardial infarction - Primary     Patient was hospitalized early December 2022 was found to have diverticulitis with abnormal EKG which looked suspicious for a myocardial infarction.  Echocardiogram confirmed the patient had late presenting MI with a LV thrombus.  Patient had apical akinesis with preserved ejection fraction.  Due to patient's history of multiple myeloma and low platelets we waited for recovery of his platelets and started him on heparin drip during the hospital and then on Eliquis 5 mg twice daily.  He was also started on aspirin 81 mg daily and atorvastatin 40 mg nightly.  Patient off BB due to bradycardia   Stress test showed moderate fixed defect involving distal left ventricle and the distal inferior wall also the apex and distal lateral wall likely related to old infarct  Repeat echocardiogram showed resolution of LV thrombus and also showed ejection fraction of 51%  Recent stress nuclear for CP - negative for ischemia           Essential hypertension     Blood pressure is elevated  blood pressure log some elevated numbers   Stopped  beta-blocker at 25 mg twice daily due to bradycardia   Increase  hydralazine to 50 mg twice  daily recommend keeping a blood pressure log may need further escalation of blood pressure medications pending blood pressure log  Salt restriction   Sleep study was positive for severe sleep apnea using CPAP           Relevant Medications    hydrALAZINE (APRESOLINE) 50 MG tablet    Dyslipidemia     Lab Results   Component Value Date    CHOLESTEROL 108 02/20/2024    TRIGLYCERIDE 89 02/20/2024    HDL 74 02/20/2024    CALCLDL 16 02/20/2024     LDL very low can decrease lipitor down to 20 mg    all parameters at goal           Apical mural thrombus following MI  (CMD)     Resolution of apical mural thrombus from echocardiogram January 2023  I kept the patient on Eliquis for longer period of time due to use of Revlimid which has tendency to increase risk of thromboembolism's.  He is now being taken off of this medication for plans for bone marrow biopsy 9/14/2023.  No further resumption of Eliquis at this time..         Relevant Medications    hydrALAZINE (APRESOLINE) 50 MG tablet    Aortic valve regurgitation     Mild to moderate aortic regurgitation- March 2024   He does have severe fatigue and some shortness of breath unlikely contributing from his valve disease         Relevant Medications    hydrALAZINE (APRESOLINE) 50 MG tablet            FOLLOW UP     in 3 month(s)      Heaven Dorantes MD  [facility] Cardiologist

## (undated) DEVICE — CATHETER,URETHRAL,REDRUBBER,STERILE,22FR: Brand: MEDLINE

## (undated) DEVICE — IMPLANTABLE DEVICE: Type: IMPLANTABLE DEVICE | Site: HEART | Status: NON-FUNCTIONAL

## (undated) DEVICE — SUT ETHIB 1 CTX CR8 18IN CX30D

## (undated) DEVICE — PK HEART OPN 10

## (undated) DEVICE — SUT SILK 0/0 CT2 18IN C027D

## (undated) DEVICE — CANN AORT/ROOT DLP W/VNT/LN 14G 7F 14.6CM

## (undated) DEVICE — GLV SURG PREMIERPRO MIC LTX PF SZ7 BRN

## (undated) DEVICE — PK PERFUS CUST W/CARDIOPLEGIA

## (undated) DEVICE — SUT ETHIB 2/0 SH SH 36IN X523H

## (undated) DEVICE — ANTIBACTERIAL UNDYED BRAIDED (POLYGLACTIN 910), SYNTHETIC ABSORBABLE SUTURE: Brand: COATED VICRYL

## (undated) DEVICE — DRP SLUSH MACH

## (undated) DEVICE — AVID DUAL STAGE VENOUS DRAINAGE CANNULA: Brand: AVID DUAL STAGE VENOUS DRAINAGE CANNULA

## (undated) DEVICE — TTL1LYR 16FR10ML 100%SIL TMPST TR: Brand: MEDLINE

## (undated) DEVICE — 3M™ MEDIPORE™ H SOFT CLOTH SURGICAL TAPE, 2863, 3 IN X 10 YD, 12/CASE: Brand: 3M™ MEDIPORE™

## (undated) DEVICE — SUT SILK 2 SUTUPAK TIE 60IN SA8H 2STRAND

## (undated) DEVICE — LEVEL SENSORS PADS ARE USED TO ATTACH THE LEVEL SENSORS TO A HARD SHELL RESERVOIR. INCLUDES COUPLING GEL.: Brand: TERUMO® ADVANCED PERFUSION SYSTEM 1

## (undated) DEVICE — SYS VASOVIEW HEMOPRO ENDOSCOPIC HARVST VESL

## (undated) DEVICE — SUT PROLN 6/0 C1 D/A 30IN 8706H

## (undated) DEVICE — SUT SILK 4/0 TIES 18IN A183H

## (undated) DEVICE — ADAPT/Y PERFUS DLP FML/LUER COLR/CODE/CLMP 8.9AND25.4CM

## (undated) DEVICE — EZ GLIDE AORTIC CANNULA: Brand: EDWARDS LIFESCIENCES EZ GLIDE AORTIC CANNULA

## (undated) DEVICE — SWAN-GANZ CCOMBO V THERMODILUTION CATHETER: Brand: SWAN-GANZ CCOMBO V

## (undated) DEVICE — CONNECT Y INTERSEPT W/LL 3/8 X 3/8 X 3/8IN

## (undated) DEVICE — CANN VESL DLP 1WY BLNT/TP 3MM

## (undated) DEVICE — SUT SILK 2/0 TIES 18IN A185H

## (undated) DEVICE — TBG SXN RIGD MINI/SUCKER 9F 4.75IN

## (undated) DEVICE — SUT PROLN 7/0 8747H

## (undated) DEVICE — SUT PROLN 7/0 BV1 D/A 24IN 8702H

## (undated) DEVICE — PAD ARMBRD SURG CONVOL 7.5X20X2IN

## (undated) DEVICE — SUT SILK 2/0 CT1 CR8 18IN C022D

## (undated) DEVICE — TBG SXN INTRACARD RIDGID FLUT 24F .25X13IN A/

## (undated) DEVICE — CLTH CLENS READYCLEANSE PERI CARE PK/5

## (undated) DEVICE — SUCTION CANISTER, 2500CC, RIGID: Brand: DEROYAL

## (undated) DEVICE — PK ATS CUST W CARDIOTOMY RESEVOIR

## (undated) DEVICE — FLTR RESERV PERFUS INTERSEPT 02 STRL

## (undated) DEVICE — DR ROGERS OH: Brand: MEDLINE INDUSTRIES, INC.

## (undated) DEVICE — SUT SILK B CARDIO BB 5/0 30IN K880H BX/36

## (undated) DEVICE — KT INTRO SHEATH PERC W/FULL DRP 9F

## (undated) DEVICE — SENSR CERBRL O2 PK/2

## (undated) DEVICE — ELECTRD BLD EZ CLN STD 2.5IN

## (undated) DEVICE — Device